# Patient Record
Sex: FEMALE | Race: WHITE | Employment: OTHER | URBAN - METROPOLITAN AREA
[De-identification: names, ages, dates, MRNs, and addresses within clinical notes are randomized per-mention and may not be internally consistent; named-entity substitution may affect disease eponyms.]

---

## 2017-01-23 RX ORDER — OLMESARTAN MEDOXOMIL 20 MG/1
TABLET, FILM COATED ORAL
Qty: 90 TAB | Refills: 0 | Status: SHIPPED | OUTPATIENT
Start: 2017-01-23 | End: 2017-05-03 | Stop reason: SDUPTHER

## 2017-03-13 ENCOUNTER — OFFICE VISIT (OUTPATIENT)
Dept: INTERNAL MEDICINE CLINIC | Age: 72
End: 2017-03-13

## 2017-03-13 VITALS
RESPIRATION RATE: 16 BRPM | HEART RATE: 98 BPM | DIASTOLIC BLOOD PRESSURE: 69 MMHG | WEIGHT: 183 LBS | SYSTOLIC BLOOD PRESSURE: 123 MMHG | BODY MASS INDEX: 30.49 KG/M2 | TEMPERATURE: 98.4 F | HEIGHT: 65 IN | OXYGEN SATURATION: 98 %

## 2017-03-13 DIAGNOSIS — L30.9 DERMATITIS: Primary | ICD-10-CM

## 2017-03-13 RX ORDER — BETAMETHASONE DIPROPIONATE 0.5 MG/G
CREAM TOPICAL 2 TIMES DAILY
Qty: 30 G | Refills: 0 | Status: SHIPPED | OUTPATIENT
Start: 2017-03-13 | End: 2017-03-23 | Stop reason: SDUPTHER

## 2017-03-13 RX ORDER — ALPRAZOLAM 0.25 MG/1
TABLET ORAL
Qty: 30 TAB | Refills: 1 | OUTPATIENT
Start: 2017-03-13 | End: 2017-06-13 | Stop reason: SDUPTHER

## 2017-03-13 NOTE — MR AVS SNAPSHOT
Visit Information Date & Time Provider Department Dept. Phone Encounter #  
 3/13/2017 10:40 AM Cortez Marquez NP [facility] [address] [id number] Upcoming Health Maintenance Date Due DTaP/Tdap/Td series (1 - Tdap) 7/22/1965 Pneumococcal 65+ Low/Medium Risk (1 of 2 - PCV13) 7/22/2009 INFLUENZA AGE 9 TO ADULT 8/1/2016 MEDICARE YEARLY EXAM 12/7/2017 GLAUCOMA SCREENING Q2Y 7/28/2018 BREAST CANCER SCRN MAMMOGRAM 8/29/2018 COLONOSCOPY 11/15/2021 Allergies as of 3/13/2017  Review Complete On: 3/13/2017 By: Cortez Marquez NP Severity Noted Reaction Type Reactions Ciprofloxacin  02/25/2016    Swelling Other Medication  02/25/2016    Swelling Steroid shots Pcn [Penicillins]  01/25/2013    Rash Current Immunizations  Never Reviewed Name Date Influenza Vaccine 10/1/2015, 10/27/2014 Zoster Vaccine, Live 1/1/2013 Not reviewed this visit You Were Diagnosed With   
  
 Codes Comments Dermatitis    -  Primary ICD-10-CM: L30.9 ICD-9-CM: 692.9 Vitals BP Pulse Temp Resp Height(growth percentile) Weight(growth percentile) 123/69 (BP 1 Location: Left arm, BP Patient Position: Sitting) 98 98.4 °F (36.9 °C) (Oral) 16 5' 5\" (1.651 m) 183 lb (83 kg) SpO2 BMI OB Status Smoking Status 98% 30.45 kg/m2 Postmenopausal Never Smoker BMI and BSA Data Body Mass Index Body Surface Area  
 30.45 kg/m 2 1.95 m 2 Preferred Pharmacy Pharmacy Name Phone [facility] 1 [address] AT [address] [facility] [phone] Your Updated Medication List  
  
   
This list is accurate as of: 3/13/17 11:40 AM.  Always use your most recent med list.  
  
  
  
  
 ALPRAZolam 0.25 mg tablet Commonly known as:  XANAX  
TAKE 1 TABLET BY MOUTH EACH EVENING  
  
 aspirin 81 mg tablet Take 81 mg by mouth daily. atorvastatin 10 mg tablet Commonly known as:  LIPITOR  
TAKE 1 TABLET BY MOUTH EVERY DAY  
  
 augmented betamethasone dipropionate 0.05 % topical cream  
Commonly known as:  Geanie Frame Apply  to affected area two (2) times a day. BENICAR 20 mg tablet Generic drug:  olmesartan TAKE 1 TABLET BY MOUTH EVERY DAY  
  
 BIOTIN PO Take 1,000 mcg by mouth daily. CALCIUM 600 WITH VITAMIN D3 600 mg(1,500mg) -400 unit Cap Generic drug:  Calcium-Cholecalciferol (D3) Take  by mouth. ERYPED 200 200 mg/5 mL suspension Generic drug:  erythromycin FISH OIL 1,000 mg Cap Generic drug:  omega-3 fatty acids-vitamin e Take 1 Cap by mouth daily. magnesium 250 mg Tab Take 1 Tab by mouth daily. multivitamin tablet Commonly known as:  ONE A DAY Take 1 Tab by mouth daily. NexIUM 40 mg capsule Generic drug:  esomeprazole Take 40 mg by mouth daily. ondansetron 4 mg disintegrating tablet Commonly known as:  ZOFRAN ODT Take 1 Tab by mouth every eight (8) hours as needed for Nausea. PROMISEB Crea Generic drug:  Emollient Combination No.43  
by Apply Externally route. valACYclovir 500 mg tablet Commonly known as:  VALTREX Take  by mouth two (2) times a day. VITAMIN D3 1,000 unit tablet Generic drug:  cholecalciferol Take  by mouth daily. Prescriptions Sent to Pharmacy Refills  
 augmented betamethasone dipropionate (DIPROLENE-AF) 0.05 % topical cream 0 Sig: Apply  to affected area two (2) times a day. Class: Normal  
 Pharmacy: [facility] [address] [place] [address] [id number] AT [facility] of [address] ([facility] Ph #: [phone] Route: Topical  
  
Patient Instructions Dermatitis: Care Instructions Your Care Instructions Dermatitis is the general name used for any rash or inflammation of the skin. Different kinds of dermatitis cause different kinds of rashes. Common causes of a rash include new medicines, plants (such as poison oak or poison ivy), heat, and stress. Certain illnesses can also cause a rash. An allergic reaction to something that touches your skin, such as latex, nickel, or poison ivy, is called contact dermatitis. Contact dermatitis may also be caused by something that irritates the skin, such as bleach, a chemical, or soap. These types of rashes cannot be spread from person to person. How long your rash will last depends on what caused it. Rashes may last a few days or months. Follow-up care is a key part of your treatment and safety. Be sure to make and go to all appointments, and call your doctor if you are having problems. It's also a good idea to know your test results and keep a list of the medicines you take. How can you care for yourself at home? · Do not scratch the rash. Cut your nails short, and file them smooth. Or wear gloves if this helps keep you from scratching. · Wash the area with water only. Pat dry. · Put cold, wet cloths on the rash to reduce itching. · Keep cool, and stay out of the sun. · Leave the rash open to the air as much as possible. · If the rash itches, use hydrocortisone cream. Follow the directions on the label. Calamine lotion may help for plant rashes. · Take an over-the-counter antihistamine, such as diphenhydramine (Benadryl) or loratadine (Claritin), to help calm the itching. Read and follow all instructions on the label. · If your doctor prescribed a cream, use it as directed. If your doctor prescribed medicine, take it exactly as directed. When should you call for help? Call your doctor now or seek immediate medical care if: 
· You have symptoms of infection, such as: 
¨ Increased pain, swelling, warmth, or redness. ¨ Red streaks leading from the area. ¨ Pus draining from the area. ¨ A fever. · You have joint pain along with the rash. Watch closely for changes in your health, and be sure to contact your doctor if: 
· Your rash is changing or getting worse. · You are not getting better as expected. Where can you learn more? Go to http://eric-layla.info/. Enter [phone] in the search box to learn more about \"Dermatitis: Care Instructions. \" Current as of: May 10, 2016 Content Version: 11.1 © 1705-6559 [employer]. Care instructions adapted under license by [employer]mension (which disclaims liability or warranty for this information). If you have questions about a medical condition or this instruction, always ask your healthcare professional. [address] any warranty or liability for your use of this information. Introducing hospitals & HEALTH SERVICES! Dear Regulo العراقي: Thank you for requesting a CytoLogic account. Our records indicate that you already have an active CytoLogic account. You can access your account anytime at [email or web address] Did you know that you can access your hospital and ER discharge instructions at any time in CytoLogic? You can also review all of your test results from your hospital stay or ER visit. Additional Information If you have questions, please visit the Frequently Asked Questions section of the CytoLogic website at [email or web address] Remember, CytoLogic is NOT to be used for urgent needs. For medical emergencies, dial 911. Now available from your iPhone and Android! Please provide this summary of care documentation to your next provider. Your primary care clinician is listed as Tari Li. If you have any questions after today's visit, please call [phone].

## 2017-03-13 NOTE — PROGRESS NOTES
HISTORY OF PRESENT ILLNESS  Cherelle Celis is a 66 y.o. female. HPI  Presents with complaints of tender rash to right groin area for the past 2-3 weeks; she felt like symptoms began after she was seen in urology office for bladder biopsy. Area was initially slightly itchy and she went to see dermatology who prescribed Nystatin with Triamcinolone cream which she used for 4-5 days but had to stop due to increased burning in area. Feels like symptoms have worsened and she cannot wear underwear due to constant rubbing in area of rash. Review of Systems   Constitutional: Negative for chills and fever. Respiratory: Negative for cough. Cardiovascular: Negative for chest pain. Gastrointestinal: Negative for nausea and vomiting. Skin: Positive for itching and rash. Neurological: Negative for dizziness, tingling and headaches. Physical Exam   Constitutional: She appears well-developed and well-nourished. HENT:   Head: Normocephalic and atraumatic. Cardiovascular: Normal rate and regular rhythm. Pulmonary/Chest: Effort normal and breath sounds normal.   Skin: Rash noted. Rash is papular. Erythematous papular rash in right inguinal fold; does not have fungal or shingles characteristics   Psychiatric: Her mood appears anxious. Nursing note and vitals reviewed. ASSESSMENT and PLAN  Drew Barkley was seen today for rash. Diagnoses and all orders for this visit:    Dermatitis -- does not appear to be fungal in nature; will treat with higher potency steroid cream  -     augmented betamethasone dipropionate (DIPROLENE-AF) 0.05 % topical cream; Apply  to affected area two (2) times a day.       reviewed diet, exercise and weight control  reviewed medications and side effects in detail

## 2017-03-13 NOTE — PROGRESS NOTES
Have you been to the ER or urgent care clinic since your last visit? Went to dermo. X 2 weeks   Got antifungal creme   No better     Have you been hospitalized since your last visit? No     Have you been seen or consulted any other health care provider outside of [facility] since your last visit (including pap smears, colonoscopy screening)?    No

## 2017-03-13 NOTE — PATIENT INSTRUCTIONS
Dermatitis: Care Instructions  Your Care Instructions  Dermatitis is the general name used for any rash or inflammation of the skin. Different kinds of dermatitis cause different kinds of rashes. Common causes of a rash include new medicines, plants (such as poison oak or poison ivy), heat, and stress. Certain illnesses can also cause a rash. An allergic reaction to something that touches your skin, such as latex, nickel, or poison ivy, is called contact dermatitis. Contact dermatitis may also be caused by something that irritates the skin, such as bleach, a chemical, or soap. These types of rashes cannot be spread from person to person. How long your rash will last depends on what caused it. Rashes may last a few days or months. Follow-up care is a key part of your treatment and safety. Be sure to make and go to all appointments, and call your doctor if you are having problems. It's also a good idea to know your test results and keep a list of the medicines you take. How can you care for yourself at home? · Do not scratch the rash. Cut your nails short, and file them smooth. Or wear gloves if this helps keep you from scratching. · Wash the area with water only. Pat dry. · Put cold, wet cloths on the rash to reduce itching. · Keep cool, and stay out of the sun. · Leave the rash open to the air as much as possible. · If the rash itches, use hydrocortisone cream. Follow the directions on the label. Calamine lotion may help for plant rashes. · Take an over-the-counter antihistamine, such as diphenhydramine (Benadryl) or loratadine (Claritin), to help calm the itching. Read and follow all instructions on the label. · If your doctor prescribed a cream, use it as directed. If your doctor prescribed medicine, take it exactly as directed. When should you call for help?   Call your doctor now or seek immediate medical care if:  · You have symptoms of infection, such as:  ¨ Increased pain, swelling, warmth, or redness. ¨ Red streaks leading from the area. ¨ Pus draining from the area. ¨ A fever. · You have joint pain along with the rash. Watch closely for changes in your health, and be sure to contact your doctor if:  · Your rash is changing or getting worse. · You are not getting better as expected. Where can you learn more? Go to http://eric-layla.info/. Enter ([phone] in the search box to learn more about \"Dermatitis: Care Instructions. \"  Current as of: May 10, 2016  Content Version: 11.1  © 0442-6775 [employer]. Care instructions adapted under license by COLOURlovers (which disclaims liability or warranty for this information). If you have questions about a medical condition or this instruction, always ask your healthcare professional. [address] any warranty or liability for your use of this information.

## 2017-03-23 RX ORDER — BETAMETHASONE DIPROPIONATE 0.5 MG/G
CREAM TOPICAL 2 TIMES DAILY
Qty: 30 G | Refills: 0 | Status: SHIPPED | OUTPATIENT
Start: 2017-03-23 | End: 2017-11-08

## 2017-04-04 ENCOUNTER — OFFICE VISIT (OUTPATIENT)
Dept: INTERNAL MEDICINE CLINIC | Age: 72
End: 2017-04-04

## 2017-04-04 VITALS
HEART RATE: 89 BPM | HEIGHT: 65 IN | RESPIRATION RATE: 18 BRPM | WEIGHT: 186 LBS | DIASTOLIC BLOOD PRESSURE: 60 MMHG | BODY MASS INDEX: 30.99 KG/M2 | SYSTOLIC BLOOD PRESSURE: 111 MMHG | TEMPERATURE: 97.7 F | OXYGEN SATURATION: 98 %

## 2017-04-04 DIAGNOSIS — L23.9 ALLERGIC DERMATITIS: Primary | ICD-10-CM

## 2017-04-04 DIAGNOSIS — B37.89 CANDIDIASIS OF BREAST: ICD-10-CM

## 2017-04-04 RX ORDER — KETOCONAZOLE 20 MG/G
CREAM TOPICAL 2 TIMES DAILY
Qty: 30 G | Refills: 0 | Status: SHIPPED | OUTPATIENT
Start: 2017-04-04 | End: 2018-08-10 | Stop reason: ALTCHOICE

## 2017-04-04 NOTE — MR AVS SNAPSHOT
Visit Information Date & Time Provider Department Dept. Phone Encounter #  
 4/4/2017  8:40 AM Agnes Owens NP [facility] [address] [id number] Upcoming Health Maintenance Date Due DTaP/Tdap/Td series (1 - Tdap) 7/22/1965 Pneumococcal 65+ Low/Medium Risk (1 of 2 - PCV13) 7/22/2009 INFLUENZA AGE 9 TO ADULT 8/1/2016 MEDICARE YEARLY EXAM 12/7/2017 GLAUCOMA SCREENING Q2Y 7/28/2018 BREAST CANCER SCRN MAMMOGRAM 8/29/2018 COLONOSCOPY 11/15/2021 Allergies as of 4/4/2017  Review Complete On: 4/4/2017 By: Agnes Owens NP Severity Noted Reaction Type Reactions Ciprofloxacin  02/25/2016    Swelling Other Medication  02/25/2016    Swelling Steroid shots Pcn [Penicillins]  01/25/2013    Rash Current Immunizations  Never Reviewed Name Date Influenza Vaccine 10/1/2015, 10/27/2014 Zoster Vaccine, Live 1/1/2013 Not reviewed this visit You Were Diagnosed With   
  
 Codes Comments Allergic dermatitis    -  Primary ICD-10-CM: L23.9 ICD-9-CM: 692.9 Candidiasis of breast     ICD-10-CM: B37.89 ICD-9-CM: 112.89 Vitals BP Pulse Temp Resp Height(growth percentile) Weight(growth percentile) 111/60 (BP 1 Location: Left arm, BP Patient Position: Sitting) 89 97.7 °F (36.5 °C) (Oral) 18 5' 5\" (1.651 m) 186 lb (84.4 kg) SpO2 BMI OB Status Smoking Status 98% 30.95 kg/m2 Postmenopausal Never Smoker Vitals History BMI and BSA Data Body Mass Index Body Surface Area 30.95 kg/m 2 1.97 m 2 Preferred Pharmacy Pharmacy Name Phone [facility]ORE 1 [address]  [facility] [phone] Your Updated Medication List  
  
   
This list is accurate as of: 4/4/17  9:29 AM.  Always use your most recent med list.  
  
  
  
  
 ALPRAZolam 0.25 mg tablet Commonly known as:  Karon Delarosa TAKE 1 TABLET BY MOUTH EVERY EVENING  
  
 aspirin 81 mg tablet Take 81 mg by mouth daily. atorvastatin 10 mg tablet Commonly known as:  LIPITOR  
TAKE 1 TABLET BY MOUTH EVERY DAY  
  
 augmented betamethasone dipropionate 0.05 % topical cream  
Commonly known as:  Lucas Blend Apply  to affected area two (2) times a day. BENICAR 20 mg tablet Generic drug:  olmesartan TAKE 1 TABLET BY MOUTH EVERY DAY  
  
 BIOTIN PO Take 1,000 mcg by mouth daily. CALCIUM 600 WITH VITAMIN D3 600 mg(1,500mg) -400 unit Cap Generic drug:  Calcium-Cholecalciferol (D3) Take  by mouth. ERYPED 200 200 mg/5 mL suspension Generic drug:  erythromycin FISH OIL 1,000 mg Cap Generic drug:  omega-3 fatty acids-vitamin e Take 1 Cap by mouth daily. ketoconazole 2 % topical cream  
Commonly known as:  NIZORAL Apply  to affected area two (2) times a day. magnesium 250 mg Tab Take 1 Tab by mouth daily. multivitamin tablet Commonly known as:  ONE A DAY Take 1 Tab by mouth daily. NexIUM 40 mg capsule Generic drug:  esomeprazole Take 40 mg by mouth daily. ondansetron 4 mg disintegrating tablet Commonly known as:  ZOFRAN ODT Take 1 Tab by mouth every eight (8) hours as needed for Nausea. PROMISEB Crea Generic drug:  Emollient Combination No.43  
by Apply Externally route. valACYclovir 500 mg tablet Commonly known as:  VALTREX Take  by mouth two (2) times a day. VITAMIN D3 1,000 unit tablet Generic drug:  cholecalciferol Take  by mouth daily. Prescriptions Sent to Pharmacy Refills  
 ketoconazole (NIZORAL) 2 % topical cream 0 Sig: Apply  to affected area two (2) times a day. Class: Normal  
 Pharmacy: [facility] [address] [place] [address] [id number] AT [facility] of [address] ([facility] Ph #: [phone] Route: Topical  
  
Patient Instructions Candidiasis: Care Instructions Your Care Instructions Candidiasis (say \"kah-zzc-WL-uh-india\") is a yeast infection. Yeast normally lives in your body. But it can cause problems if your body's defenses don't work as they should. Some medicines can increase your chance of getting a yeast infection. These include antibiotics, steroids, and cancer drugs. And some diseases like AIDS and diabetes can make you more likely to get yeast infections. There are different types of yeast infections. Katherine Sink is a yeast infection in the mouth. It usually occurs in people with weak immune systems. It causes white patches inside the mouth and throat. Yeast infections of the skin usually occur in skin folds where the skin stays moist. They cause red, oozing patches on your skin. Babies can get these infections under the diaper. People who often wear gloves can get them on their hands. Many women get vaginal yeast infections. They are most common when women take antibiotics. These infections can cause the vagina to itch and burn. They also cause white discharge that looks like cottage cheese. In rare cases, yeast infects the blood. This can cause serious disease. This kind of infection is treated with medicine given through a needle into a vein (IV). After you start treatment, a yeast infection usually goes away quickly. But if your immune system is weak, the infection may come back. Tell your doctor if you get yeast infections often. Follow-up care is a key part of your treatment and safety. Be sure to make and go to all appointments, and call your doctor if you are having problems. It's also a good idea to know your test results and keep a list of the medicines you take. How can you care for yourself at home? · Take your medicines exactly as prescribed. Call your doctor if you think you are having a problem with your medicine. · Use antibiotics only as directed by your doctor. · Eat yogurt with live cultures. It has bacteria called lactobacillus.  It may help prevent some types of yeast infections. · Keep your skin clean and dry. Put powder on moist places. · If you are using a cream or suppository to treat a vaginal yeast infection, don't use condoms or a diaphragm. Use a different type of birth control. · Eat a healthy diet and get regular exercise. This will help keep your immune system strong. When should you call for help? Call your doctor now or seek immediate medical care if: 
· You have a fever. · You are pregnant and have signs of a vaginal or urinary tract infection such as: ¨ Severe itching in your vagina. ¨ Pain during sex or when you urinate. ¨ Unusual discharge from your vagina. ¨ A frequent urge to urinate. ¨ Urine that is cloudy or smells bad. Watch closely for changes in your health, and be sure to contact your doctor if: 
· You do not get better as expected. Where can you learn more? Go to http://eric-layla.info/. Enter W525 in the search box to learn more about \"Candidiasis: Care Instructions. \" Current as of: October 13, 2016 Content Version: 11.2 © 6169-6346 [employer]BaoTech. Care instructions adapted under license by [employer] Fortress (which disclaims liability or warranty for this information). If you have questions about a medical condition or this instruction, always ask your healthcare professional. [place] [address] any warranty or liability for your use of this information. Dermatitis: Care Instructions Your Care Instructions Dermatitis is the general name used for any rash or inflammation of the skin. Different kinds of dermatitis cause different kinds of rashes. Common causes of a rash include new medicines, plants (such as poison oak or poison ivy), heat, and stress. Certain illnesses can also cause a rash. An allergic reaction to something that touches your skin, such as latex, nickel, or poison ivy, is called contact dermatitis.  Contact dermatitis may also be caused by something that irritates the skin, such as bleach, a chemical, or soap. These types of rashes cannot be spread from person to person. How long your rash will last depends on what caused it. Rashes may last a few days or months. Follow-up care is a key part of your treatment and safety. Be sure to make and go to all appointments, and call your doctor if you are having problems. It's also a good idea to know your test results and keep a list of the medicines you take. How can you care for yourself at home? · Do not scratch the rash. Cut your nails short, and file them smooth. Or wear gloves if this helps keep you from scratching. · Wash the area with water only. Pat dry. · Put cold, wet cloths on the rash to reduce itching. · Keep cool, and stay out of the sun. · Leave the rash open to the air as much as possible. · If the rash itches, use hydrocortisone cream. Follow the directions on the label. Calamine lotion may help for plant rashes. · Take an over-the-counter antihistamine, such as diphenhydramine (Benadryl) or loratadine (Claritin), to help calm the itching. Read and follow all instructions on the label. · If your doctor prescribed a cream, use it as directed. If your doctor prescribed medicine, take it exactly as directed. When should you call for help? Call your doctor now or seek immediate medical care if: 
· You have symptoms of infection, such as: 
¨ Increased pain, swelling, warmth, or redness. ¨ Red streaks leading from the area. ¨ Pus draining from the area. ¨ A fever. · You have joint pain along with the rash. Watch closely for changes in your health, and be sure to contact your doctor if: 
· Your rash is changing or getting worse. · You are not getting better as expected. Where can you learn more? Go to http://eric-layla.info/. Enter ([phone] [phone] in the search box to learn more about \"Dermatitis: Care Instructions. \" 
 Current as of: October 13, 2016 Content Version: 11.2 © 2594-0454 [facility], [employer] Care instructions adapted under license by Booxmedia (which disclaims liability or warranty for this information). If you have questions about a medical condition or this instruction, always ask your healthcare professional. [address] any warranty or liability for your use of this information. Introducing [facility] & HEALTH SERVICES! Dear Greyson Counts: Thank you for requesting a KidAdmit account. Our records indicate that you already have an active KidAdmit account. You can access your account anytime at [email or web address], [email or web address]." Did you know that you can access your hospital and ER discharge instructions at any time in KidAdmit? You can also review all of your test results from your hospital stay or ER visit. Additional Information If you have questions, please visit the Frequently Asked Questions section of the KidAdmit website at [email or web address] Remember, KidAdmit is NOT to be used for urgent needs. For medical emergencies, dial 911. Now available from your iPhone and Android! Please provide this summary of care documentation to your next provider. Your primary care clinician is listed as Era Sender. If you have any questions after today's visit, please call [phone].

## 2017-04-04 NOTE — PATIENT INSTRUCTIONS
Candidiasis: Care Instructions  Your Care Instructions  Candidiasis (say \"pvj-jso-XS-uh-india\") is a yeast infection. Yeast normally lives in your body. But it can cause problems if your body's defenses don't work as they should. Some medicines can increase your chance of getting a yeast infection. These include antibiotics, steroids, and cancer drugs. And some diseases like AIDS and diabetes can make you more likely to get yeast infections. There are different types of yeast infections. Stacy Fiddler is a yeast infection in the mouth. It usually occurs in people with weak immune systems. It causes white patches inside the mouth and throat. Yeast infections of the skin usually occur in skin folds where the skin stays moist. They cause red, oozing patches on your skin. Babies can get these infections under the diaper. People who often wear gloves can get them on their hands. Many women get vaginal yeast infections. They are most common when women take antibiotics. These infections can cause the vagina to itch and burn. They also cause white discharge that looks like cottage cheese. In rare cases, yeast infects the blood. This can cause serious disease. This kind of infection is treated with medicine given through a needle into a vein (IV). After you start treatment, a yeast infection usually goes away quickly. But if your immune system is weak, the infection may come back. Tell your doctor if you get yeast infections often. Follow-up care is a key part of your treatment and safety. Be sure to make and go to all appointments, and call your doctor if you are having problems. It's also a good idea to know your test results and keep a list of the medicines you take. How can you care for yourself at home? · Take your medicines exactly as prescribed. Call your doctor if you think you are having a problem with your medicine. · Use antibiotics only as directed by your doctor. · Eat yogurt with live cultures.  It has bacteria called lactobacillus. It may help prevent some types of yeast infections. · Keep your skin clean and dry. Put powder on moist places. · If you are using a cream or suppository to treat a vaginal yeast infection, don't use condoms or a diaphragm. Use a different type of birth control. · Eat a healthy diet and get regular exercise. This will help keep your immune system strong. When should you call for help? Call your doctor now or seek immediate medical care if:  · You have a fever. · You are pregnant and have signs of a vaginal or urinary tract infection such as:  ¨ Severe itching in your vagina. ¨ Pain during sex or when you urinate. ¨ Unusual discharge from your vagina. ¨ A frequent urge to urinate. ¨ Urine that is cloudy or smells bad. Watch closely for changes in your health, and be sure to contact your doctor if:  · You do not get better as expected. Where can you learn more? Go to http://eric-layla.info/. Enter B049 in the search box to learn more about \"Candidiasis: Care Instructions. \"  Current as of: October 13, 2016  Content Version: 11.2  © 5164-3702 [employer]. Care instructions adapted under license by [employer] (which disclaims liability or warranty for this information). If you have questions about a medical condition or this instruction, always ask your healthcare professional. [place] [address] any warranty or liability for your use of this information. Dermatitis: Care Instructions  Your Care Instructions  Dermatitis is the general name used for any rash or inflammation of the skin. Different kinds of dermatitis cause different kinds of rashes. Common causes of a rash include new medicines, plants (such as poison oak or poison ivy), heat, and stress. Certain illnesses can also cause a rash.   An allergic reaction to something that touches your skin, such as latex, nickel, or poison ivy, is called contact dermatitis. Contact dermatitis may also be caused by something that irritates the skin, such as bleach, a chemical, or soap. These types of rashes cannot be spread from person to person. How long your rash will last depends on what caused it. Rashes may last a few days or months. Follow-up care is a key part of your treatment and safety. Be sure to make and go to all appointments, and call your doctor if you are having problems. It's also a good idea to know your test results and keep a list of the medicines you take. How can you care for yourself at home? · Do not scratch the rash. Cut your nails short, and file them smooth. Or wear gloves if this helps keep you from scratching. · Wash the area with water only. Pat dry. · Put cold, wet cloths on the rash to reduce itching. · Keep cool, and stay out of the sun. · Leave the rash open to the air as much as possible. · If the rash itches, use hydrocortisone cream. Follow the directions on the label. Calamine lotion may help for plant rashes. · Take an over-the-counter antihistamine, such as diphenhydramine (Benadryl) or loratadine (Claritin), to help calm the itching. Read and follow all instructions on the label. · If your doctor prescribed a cream, use it as directed. If your doctor prescribed medicine, take it exactly as directed. When should you call for help? Call your doctor now or seek immediate medical care if:  · You have symptoms of infection, such as:  ¨ Increased pain, swelling, warmth, or redness. ¨ Red streaks leading from the area. ¨ Pus draining from the area. ¨ A fever. · You have joint pain along with the rash. Watch closely for changes in your health, and be sure to contact your doctor if:  · Your rash is changing or getting worse. · You are not getting better as expected. Where can you learn more? Go to http://eric-layla.info/.   Enter [id number] in the search box to learn more about \"Dermatitis: Care Instructions. \"  Current as of: October 13, 2016  Content Version: 11.2  © 1713-1716 [employer], [facility]. Care instructions adapted under license by [employer] (which disclaims liability or warranty for this information). If you have questions about a medical condition or this instruction, always ask your healthcare professional. [place] [address] any warranty or liability for your use of this information.

## 2017-04-04 NOTE — PROGRESS NOTES
HISTORY OF PRESENT ILLNESS  Aliza May is a 66 y.o. female. HPI  Presents with concerns about return of groin rash and persistence of rash under left breast.  Was seen initially on 3/13/17 with itchy rash to right groin area and given Rx for Betamethasone cream which she used and rash resolved completely. Had applied Vaseline to area 2 days ago and immediately developed a fine rash to area. Has been having itchy reddened rash under left breast for the past 2 weeks and used some of the steroid cream but this has only lightened rash and has not resolved. She has dermatologist that she sees as needed. Review of Systems   Constitutional: Negative for chills, fever and malaise/fatigue. HENT: Negative for congestion and sore throat. Respiratory: Negative for cough and shortness of breath. Cardiovascular: Negative for chest pain and palpitations. Gastrointestinal: Negative for nausea and vomiting. Musculoskeletal: Negative for myalgias. Skin: Positive for itching and rash. Neurological: Negative for dizziness, tingling and headaches. Physical Exam   Constitutional: She is oriented to person, place, and time. She appears well-developed and well-nourished. HENT:   Head: Normocephalic and atraumatic. Neck: Normal range of motion. Neck supple. Cardiovascular: Normal rate and regular rhythm. Pulmonary/Chest: Effort normal and breath sounds normal.   Neurological: She is alert and oriented to person, place, and time. Skin: Rash noted. Rash is papular. Scattered erythematous papules to right inguinal fold; left breast with erythematous plaque to underside of breast with scattered satellite lesions   Psychiatric: She has a normal mood and affect. Her behavior is normal.   Nursing note and vitals reviewed. ASSESSMENT and PLAN  Sneha Amador was seen today for rash.     Diagnoses and all orders for this visit:    Allergic dermatitis -- right groin rash appears allergic in nature and question whether she has had local reaction to Vaseline; advised to clean area and leave open to air as much as possible    Candidiasis of breast -- to use twice daily to rash under left breast x 2 weeks; if not resolving, will have see dermatology for further evaluation. Leave open to air as much as possible. -     ketoconazole (NIZORAL) 2 % topical cream; Apply  to affected area two (2) times a day.       lab results and schedule of future lab studies reviewed with patient  reviewed diet, exercise and weight control  reviewed medications and side effects in detail

## 2017-05-03 RX ORDER — OLMESARTAN MEDOXOMIL 20 MG/1
TABLET, FILM COATED ORAL
Qty: 90 TAB | Refills: 0 | Status: SHIPPED | OUTPATIENT
Start: 2017-05-03 | End: 2017-07-30 | Stop reason: SDUPTHER

## 2017-05-24 RX ORDER — ATORVASTATIN CALCIUM 10 MG/1
TABLET, FILM COATED ORAL
Qty: 90 TAB | Refills: 0 | Status: SHIPPED | OUTPATIENT
Start: 2017-05-24 | End: 2017-09-25 | Stop reason: SDUPTHER

## 2017-06-13 ENCOUNTER — OFFICE VISIT (OUTPATIENT)
Dept: INTERNAL MEDICINE CLINIC | Age: 72
End: 2017-06-13

## 2017-06-13 ENCOUNTER — HOSPITAL ENCOUNTER (OUTPATIENT)
Dept: LAB | Age: 72
Discharge: HOME | End: 2017-06-13
Payer: MEDICARE

## 2017-06-13 VITALS
DIASTOLIC BLOOD PRESSURE: 62 MMHG | RESPIRATION RATE: 14 BRPM | BODY MASS INDEX: 30.96 KG/M2 | TEMPERATURE: 97.7 F | HEIGHT: 65 IN | HEART RATE: 76 BPM | WEIGHT: 185.8 LBS | SYSTOLIC BLOOD PRESSURE: 123 MMHG

## 2017-06-13 DIAGNOSIS — B37.2 YEAST DERMATITIS: Primary | ICD-10-CM

## 2017-06-13 DIAGNOSIS — R73.01 IMPAIRED FASTING GLUCOSE: ICD-10-CM

## 2017-06-13 DIAGNOSIS — F41.9 ANXIETY: ICD-10-CM

## 2017-06-13 DIAGNOSIS — E78.5 HYPERLIPIDEMIA LDL GOAL <130: Chronic | ICD-10-CM

## 2017-06-13 DIAGNOSIS — I10 ESSENTIAL HYPERTENSION, BENIGN: Chronic | ICD-10-CM

## 2017-06-13 PROCEDURE — 83036 HEMOGLOBIN GLYCOSYLATED A1C: CPT

## 2017-06-13 PROCEDURE — 36415 COLL VENOUS BLD VENIPUNCTURE: CPT

## 2017-06-13 PROCEDURE — 80061 LIPID PANEL: CPT

## 2017-06-13 PROCEDURE — 80053 COMPREHEN METABOLIC PANEL: CPT

## 2017-06-13 RX ORDER — ALPRAZOLAM 0.25 MG/1
TABLET ORAL
Qty: 60 TAB | Refills: 1 | Status: SHIPPED | OUTPATIENT
Start: 2017-06-13 | End: 2017-10-23 | Stop reason: SDUPTHER

## 2017-06-13 RX ORDER — KETOCONAZOLE 20 MG/G
CREAM TOPICAL DAILY
Qty: 30 G | Refills: 0 | Status: SHIPPED | OUTPATIENT
Start: 2017-06-13 | End: 2017-11-08 | Stop reason: SDUPTHER

## 2017-06-13 RX ORDER — FLUCONAZOLE 150 MG/1
150 TABLET ORAL DAILY
Qty: 2 TAB | Refills: 0 | Status: SHIPPED | OUTPATIENT
Start: 2017-06-13 | End: 2017-06-14

## 2017-06-13 NOTE — MR AVS SNAPSHOT
Visit Information Date & Time Provider Department Dept. Phone Encounter #  
 6/13/2017 10:40 AM Ada Acuña NP [facility] [address] [id number] Upcoming Health Maintenance Date Due DTaP/Tdap/Td series (1 - Tdap) 7/22/1966 Pneumococcal 65+ Low/Medium Risk (1 of 2 - PCV13) 7/22/2009 INFLUENZA AGE 9 TO ADULT 8/1/2017 MEDICARE YEARLY EXAM 12/7/2017 GLAUCOMA SCREENING Q2Y 7/28/2018 BREAST CANCER SCRN MAMMOGRAM 8/29/2018 COLONOSCOPY 11/15/2021 Allergies as of 6/13/2017  Review Complete On: 6/13/2017 By: Ada Acuña NP Severity Noted Reaction Type Reactions Ciprofloxacin  02/25/2016    Swelling Other Medication  02/25/2016    Swelling Steroid shots Pcn [Penicillins]  01/25/2013    Rash Current Immunizations  Never Reviewed Name Date Influenza Vaccine 10/1/2015, 10/27/2014 Zoster Vaccine, Live 1/1/2013 Not reviewed this visit You Were Diagnosed With   
  
 Codes Comments Yeast dermatitis    -  Primary ICD-10-CM: B37.2 ICD-9-CM: 112.3 Essential hypertension, benign     ICD-10-CM: I10 
ICD-9-CM: 401.1 Hyperlipidemia LDL goal <130     ICD-10-CM: E78.5 ICD-9-CM: 272.4 Anxiety     ICD-10-CM: F41.9 ICD-9-CM: 300.00 Impaired fasting glucose     ICD-10-CM: R73.01 
ICD-9-CM: 790.21 Vitals BP Pulse Temp Resp Height(growth percentile) Weight(growth percentile) 123/62 (BP 1 Location: Left arm, BP Patient Position: Sitting) 76 97.7 °F (36.5 °C) (Oral) 14 5' 5\" (1.651 m) 185 lb 12.8 oz (84.3 kg) BMI OB Status Smoking Status 30.92 kg/m2 Postmenopausal Never Smoker BMI and BSA Data Body Mass Index Body Surface Area 30.92 kg/m 2 1.97 m 2 Preferred Pharmacy Pharmacy Name Phone [facility] [address]  [facility] [phone] Your Updated Medication List  
 This list is accurate as of: 6/13/17 11:46 AM.  Always use your most recent med list.  
  
  
  
  
 ALPRAZolam 0.25 mg tablet Commonly known as:  XANAX  
TAKE 1 TABLET BY MOUTH twice daily as needed  
  
 aspirin 81 mg tablet Take 81 mg by mouth daily. atorvastatin 10 mg tablet Commonly known as:  LIPITOR  
TAKE 1 TABLET BY MOUTH EVERY DAY  
  
 augmented betamethasone dipropionate 0.05 % topical cream  
Commonly known as:  Darcella Pancoast Apply  to affected area two (2) times a day. BENICAR 20 mg tablet Generic drug:  olmesartan TAKE 1 TABLET BY MOUTH EVERY DAY  
  
 BIOTIN PO Take 1,000 mcg by mouth daily. CALCIUM 600 WITH VITAMIN D3 600 mg(1,500mg) -400 unit Cap Generic drug:  Calcium-Cholecalciferol (D3) Take  by mouth. ERYPED 200 200 mg/5 mL suspension Generic drug:  erythromycin FISH OIL 1,000 mg Cap Generic drug:  omega-3 fatty acids-vitamin e Take 1 Cap by mouth daily. fluconazole 150 mg tablet Commonly known as:  DIFLUCAN Take 1 Tab by mouth daily for 1 day. Repeat in 1 week * ketoconazole 2 % topical cream  
Commonly known as:  NIZORAL Apply  to affected area two (2) times a day. * ketoconazole 2 % topical cream  
Commonly known as:  NIZORAL Apply  to affected area daily. magnesium 250 mg Tab Take 1 Tab by mouth daily. multivitamin tablet Commonly known as:  ONE A DAY Take 1 Tab by mouth daily. NexIUM 40 mg capsule Generic drug:  esomeprazole Take 40 mg by mouth daily. ondansetron 4 mg disintegrating tablet Commonly known as:  ZOFRAN ODT Take 1 Tab by mouth every eight (8) hours as needed for Nausea. PROMISEB Crea Generic drug:  Emollient Combination No.43  
by Apply Externally route. valACYclovir 500 mg tablet Commonly known as:  VALTREX Take  by mouth two (2) times a day. VITAMIN D3 1,000 unit tablet Generic drug:  cholecalciferol Take  by mouth daily. * Notice: This list has 2 medication(s) that are the same as other medications prescribed for you. Read the directions carefully, and ask your doctor or other care provider to review them with you. Prescriptions Printed Refills ALPRAZolam (XANAX) 0.25 mg tablet 1 Sig: TAKE 1 TABLET BY MOUTH twice daily as needed Class: Print Prescriptions Sent to Pharmacy Refills  
 ketoconazole (NIZORAL) 2 % topical cream 0 Sig: Apply  to affected area daily. Class: Normal  
 Pharmacy: [facility]  [address] [place] - [address] AT [facility] of [address] ([facility] Ph #: [phone] Route: Topical  
 fluconazole (DIFLUCAN) 150 mg tablet 0 Sig: Take 1 Tab by mouth daily for 1 day. Repeat in 1 week Class: Normal  
 Pharmacy: [facility] [address], [place] - [address] AT [facility] of [address] ([facility] Ph #: [phone] Route: Oral  
  
We Performed the Following HEMOGLOBIN A1C WITH EAG [03688 CPT(R)] LIPID PANEL [89035 CPT(R)] METABOLIC PANEL, COMPREHENSIVE [56276 CPT(R)] Patient Instructions Yeast Skin Infection: Care Instructions Your Care Instructions Yeast normally lives on your skin. Sometimes too much yeast can overgrow in certain areas of the skin and cause an infection. The infection causes red, scaly, moist patches on your skin that may itch. Common areas for skin yeast infections are skin folds under the breasts or belly area. The warm and moist areas in the skin folds can make it easier for yeast to overgrow. Yeast infections also can be found on other parts of the body such as the groin or armpits. You will probably get a cream or ointment that contains an antifungal medicine. Examples of these medicines are miconazole and clotrimazole. You put it on your skin to treat the infection. Your doctor may give you a prescription for the cream or ointment.  Or you may be able to buy it without a prescription at most drugstores. If the infection is severe, the doctor will prescribe antifungal pills. A yeast infection usually goes away after about a week of treatment. But it's important to use the medicine for as long as your doctor tells you to. Follow-up care is a key part of your treatment and safety. Be sure to make and go to all appointments, and call your doctor if you are having problems. It's also a good idea to know your test results and keep a list of the medicines you take. How can you care for yourself at home? · Be safe with medicines. Take your medicines exactly as prescribed. Call your doctor if you think you are having a problem with your medicine. · Keep your skin clean and dry. Your doctor may suggest using powder that contains an antifungal medicine in the skin folds. · Wear loose clothing. When should you call for help? Call your doctor now or seek immediate medical care if: 
· You have symptoms of infection, such as: 
¨ Increased pain, swelling, warmth, or redness. ¨ Red streaks leading from the area. ¨ Pus draining from the area. ¨ A fever. Watch closely for changes in your health, and be sure to contact your doctor if: 
· You find a new sore. · You do not get better as expected. Where can you learn more? Go to http://eric-layla.info/. Enter S929 in the search box to learn more about \"Yeast Skin Infection: Care Instructions. \" Current as of: November 3, 2016 Content Version: 11.2 © 7331-5039 [employer]. Care instructions adapted under license by [employer] (which disclaims liability or warranty for this information). If you have questions about a medical condition or this instruction, always ask your healthcare professional. [place] [address] any warranty or liability for your use of this information. Introducing [facility]! Dear Antony Dys: Thank you for requesting a Virtual Web account. Our records indicate that you already have an active Virtual Web account. You can access your account anytime at [email or web address] Did you know that you can access your hospital and ER discharge instructions at any time in Virtual Web? You can also review all of your test results from your hospital stay or ER visit. Additional Information If you have questions, please visit the Frequently Asked Questions section of the Virtual Web website at [email or web address] Remember, Virtual Web is NOT to be used for urgent needs. For medical emergencies, dial 911. Now available from your iPhone and Android! Please provide this summary of care documentation to your next provider. Your primary care clinician is listed as Cassi Herrmann. If you have any questions after today's visit, please call [phone].

## 2017-06-13 NOTE — PROGRESS NOTES
HISTORY OF PRESENT ILLNESS  Teri Tejada is a 66 y.o. female. HPI  Presents with recurrence of rash under bilateral breasts and inguinal folds. Was seen in March and April for similar symptoms and this resolved with use of Nizoral cream.  Has changed all of her undergarments to cotton and uses gentle soap. Has been using talcum powder to skin folds and also zinc cream for possible chafing. Noted return of reddened bumps under left breast 2 weeks ago and now has spread to right breast and bilateral groin areas. She is very distressed about recurring rash and this has made her very anxious. She has not contacted her dermatologist as was advised at last visit. Very concerned about trying to prevent rash and has been using multiple OTC powders and creams over the past several months. Subjective:   Teri Tejaad is a 66 y.o. female with hyperlipidemia. Cardiovascular risk analysis - 66 y.o. female LDL goal is under 130. ROS: taking medications as instructed, no medication side effects noted, no TIA's, no chest pain on exertion, no dyspnea on exertion, no swelling of ankles. Tolerating meds, no myalgias or other side effects noted  New concerns: tolerating Atorvastatin 10 mg without issues . Subjective:   Teri Tejada is a 66 y.o. female with hypertension. Hypertension ROS: taking medications as instructed, no medication side effects noted, no TIA's, no chest pain on exertion, no dyspnea on exertion, no swelling of ankles. New concerns: has been well controlled on current dose of Benicar 20 mg daily. Reports that she has been very anxious since her  was diagnosed with recent medical condition which she declined to discuss and does not feel that she has been coping well. Feeling very nervous during the daytime and having trouble concentrating. Takes Xanax 0.25mg on a nightly basis. Denies harmful thoughts towards self or others.   Had contacted pharmacy for refill of Xanax but this has not been approved yet. Review of Systems   Constitutional: Negative for chills and fever. HENT: Negative for congestion and sore throat. Respiratory: Negative for cough, sputum production and shortness of breath. Cardiovascular: Negative for chest pain and palpitations. Genitourinary: Negative for dysuria and frequency. Musculoskeletal: Negative for myalgias. Skin: Positive for itching and rash. Neurological: Positive for dizziness. Negative for headaches. Psychiatric/Behavioral: The patient is nervous/anxious. /62 (BP 1 Location: Left arm, BP Patient Position: Sitting)  Pulse 76  Temp 97.7 °F (36.5 °C) (Oral)   Resp 14  Ht 5' 5\" (1.651 m)  Wt 185 lb 12.8 oz (84.3 kg)  BMI 30.92 kg/m2  Physical Exam   Constitutional: She is oriented to person, place, and time. She appears well-developed and well-nourished. HENT:   Head: Normocephalic and atraumatic. Neck: Normal range of motion. Neck supple. No thyromegaly present. Cardiovascular: Normal rate and regular rhythm. Pulmonary/Chest: Effort normal and breath sounds normal. She has no wheezes. Abdominal: Soft. Bowel sounds are normal.   Musculoskeletal: Normal range of motion. Neurological: She is alert and oriented to person, place, and time. Skin: Rash noted. Rash is papular. Erythematous papules and plaques to intertrigo spaces under bilateral breasts and in bilateral inguinal folds   Psychiatric: Her behavior is normal. Her mood appears anxious. Her speech is rapid and/or pressured. ASSESSMENT and PLAN  Honey Dean was seen today for rash and labs. Diagnoses and all orders for this visit:    Yeast dermatitis -- will restart use of Nizoral and treat systemically with Diflucan weekly x 2 weeks; advised her to follow up with her dermatologist if symptoms not improving or continue to recur.   Will check A1c based on recurrent candidiasis  -     ketoconazole (NIZORAL) 2 % topical cream; Apply  to affected area daily. -     fluconazole (DIFLUCAN) 150 mg tablet; Take 1 Tab by mouth daily for 1 day. Repeat in 1 week    Essential hypertension, benign --stable  -     METABOLIC PANEL, COMPREHENSIVE    Hyperlipidemia LDL goal <130 --stable  -     METABOLIC PANEL, COMPREHENSIVE  -     LIPID PANEL    Anxiety -- has been more anxious due to recent medical issues with ; refill of Xanax and advised her that she can take 1 tab twice daily if needed. -     ALPRAZolam (XANAX) 0.25 mg tablet; TAKE 1 TABLET BY MOUTH twice daily as needed     profile was accessed online for [employer] and reviewed by me during this encounter. I did not see evidence of inappropriate or suspicious controlled substance prescription activity. Impaired fasting glucose  -     HEMOGLOBIN A1C WITH EAG      lab results and schedule of future lab studies reviewed with patient  reviewed diet, exercise and weight control  reviewed medications and side effects in detail  This note will not be viewable in [email or web address].

## 2017-06-13 NOTE — PATIENT INSTRUCTIONS
Yeast Skin Infection: Care Instructions  Your Care Instructions    Yeast normally lives on your skin. Sometimes too much yeast can overgrow in certain areas of the skin and cause an infection. The infection causes red, scaly, moist patches on your skin that may itch. Common areas for skin yeast infections are skin folds under the breasts or belly area. The warm and moist areas in the skin folds can make it easier for yeast to overgrow. Yeast infections also can be found on other parts of the body such as the groin or armpits. You will probably get a cream or ointment that contains an antifungal medicine. Examples of these medicines are miconazole and clotrimazole. You put it on your skin to treat the infection. Your doctor may give you a prescription for the cream or ointment. Or you may be able to buy it without a prescription at most drugstores. If the infection is severe, the doctor will prescribe antifungal pills. A yeast infection usually goes away after about a week of treatment. But it's important to use the medicine for as long as your doctor tells you to. Follow-up care is a key part of your treatment and safety. Be sure to make and go to all appointments, and call your doctor if you are having problems. It's also a good idea to know your test results and keep a list of the medicines you take. How can you care for yourself at home? · Be safe with medicines. Take your medicines exactly as prescribed. Call your doctor if you think you are having a problem with your medicine. · Keep your skin clean and dry. Your doctor may suggest using powder that contains an antifungal medicine in the skin folds. · Wear loose clothing. When should you call for help? Call your doctor now or seek immediate medical care if:  · You have symptoms of infection, such as:  ¨ Increased pain, swelling, warmth, or redness. ¨ Red streaks leading from the area. ¨ Pus draining from the area. ¨ A fever.   Watch closely for changes in your health, and be sure to contact your doctor if:  · You find a new sore. · You do not get better as expected. Where can you learn more? Go to http://eric-layla.info/. Enter D033 in the search box to learn more about \"Yeast Skin Infection: Care Instructions. \"  Current as of: November 3, 2016  Content Version: 11.2  © 9660-4443 [employer]. Care instructions adapted under license by [employer] (which disclaims liability or warranty for this information). If you have questions about a medical condition or this instruction, always ask your healthcare professional. [address] any warranty or liability for your use of this information.

## 2017-06-14 LAB
ALBUMIN SERPL-MCNC: 4.8 G/DL (ref 3.5–4.8)
ALBUMIN/GLOB SERPL: 1.6 {RATIO} (ref 1.2–2.2)
ALP SERPL-CCNC: 104 IU/L (ref 39–117)
ALT SERPL-CCNC: 20 IU/L (ref 0–32)
AST SERPL-CCNC: 24 IU/L (ref 0–40)
BILIRUB SERPL-MCNC: 0.6 MG/DL (ref 0–1.2)
BUN SERPL-MCNC: 12 MG/DL (ref 8–27)
BUN/CREAT SERPL: 13 (ref 12–28)
CALCIUM SERPL-MCNC: 10.2 MG/DL (ref 8.7–10.3)
CHLORIDE SERPL-SCNC: 100 MMOL/L (ref 96–106)
CHOLEST SERPL-MCNC: 187 MG/DL (ref 100–199)
CO2 SERPL-SCNC: 25 MMOL/L (ref 18–29)
CREAT SERPL-MCNC: 0.93 MG/DL (ref 0.57–1)
EST. AVERAGE GLUCOSE BLD GHB EST-MCNC: 117 MG/DL
GLOBULIN SER CALC-MCNC: 3 G/DL (ref 1.5–4.5)
GLUCOSE SERPL-MCNC: 111 MG/DL (ref 65–99)
HBA1C MFR BLD: 5.7 % (ref 4.8–5.6)
HDLC SERPL-MCNC: 79 MG/DL
INTERPRETATION, 910389: NORMAL
LDLC SERPL CALC-MCNC: 83 MG/DL (ref 0–99)
POTASSIUM SERPL-SCNC: 5.4 MMOL/L (ref 3.5–5.2)
PROT SERPL-MCNC: 7.8 G/DL (ref 6–8.5)
SODIUM SERPL-SCNC: 141 MMOL/L (ref 134–144)
TRIGL SERPL-MCNC: 126 MG/DL (ref 0–149)
VLDLC SERPL CALC-MCNC: 25 MG/DL (ref 5–40)

## 2017-07-30 RX ORDER — OLMESARTAN MEDOXOMIL 20 MG/1
TABLET, FILM COATED ORAL
Qty: 90 TAB | Refills: 0 | Status: SHIPPED | OUTPATIENT
Start: 2017-07-30 | End: 2017-11-01 | Stop reason: SDUPTHER

## 2017-09-25 RX ORDER — ATORVASTATIN CALCIUM 10 MG/1
TABLET, FILM COATED ORAL
Qty: 90 TAB | Refills: 0 | Status: SHIPPED | OUTPATIENT
Start: 2017-09-25 | End: 2017-12-21 | Stop reason: SDUPTHER

## 2017-11-01 RX ORDER — OLMESARTAN MEDOXOMIL 20 MG/1
TABLET, FILM COATED ORAL
Qty: 90 TAB | Refills: 0 | Status: SHIPPED | OUTPATIENT
Start: 2017-11-01 | End: 2017-12-18

## 2017-11-08 ENCOUNTER — OFFICE VISIT (OUTPATIENT)
Dept: INTERNAL MEDICINE CLINIC | Age: 72
End: 2017-11-08

## 2017-11-08 VITALS
RESPIRATION RATE: 14 BRPM | HEIGHT: 65 IN | OXYGEN SATURATION: 96 % | DIASTOLIC BLOOD PRESSURE: 59 MMHG | WEIGHT: 193.2 LBS | HEART RATE: 83 BPM | TEMPERATURE: 97.8 F | BODY MASS INDEX: 32.19 KG/M2 | SYSTOLIC BLOOD PRESSURE: 117 MMHG

## 2017-11-08 DIAGNOSIS — R73.02 GLUCOSE INTOLERANCE (IMPAIRED GLUCOSE TOLERANCE): ICD-10-CM

## 2017-11-08 DIAGNOSIS — E78.5 HYPERLIPIDEMIA LDL GOAL <130: Chronic | ICD-10-CM

## 2017-11-08 DIAGNOSIS — I10 ESSENTIAL HYPERTENSION, BENIGN: Primary | Chronic | ICD-10-CM

## 2017-11-08 DIAGNOSIS — M79.605 PAIN IN BOTH LOWER EXTREMITIES: ICD-10-CM

## 2017-11-08 DIAGNOSIS — M79.604 PAIN IN BOTH LOWER EXTREMITIES: ICD-10-CM

## 2017-11-08 RX ORDER — MONTELUKAST SODIUM 10 MG/1
TABLET ORAL
Refills: 3 | COMMUNITY
Start: 2017-10-09 | End: 2017-12-18

## 2017-11-08 RX ORDER — FLUTICASONE PROPIONATE 50 MCG
SPRAY, SUSPENSION (ML) NASAL
Refills: 3 | COMMUNITY
Start: 2017-10-09 | End: 2019-03-20

## 2017-11-08 NOTE — PROGRESS NOTES
HISTORY OF PRESENT ILLNESS  Particedith Gordon is a 67 y.o. female. HPI   Hypertension ROS: taking medications as instructed, no medication side effects noted, no TIA's, no chest pain on exertion, no dyspnea on exertion, no swelling of ankles. New concerns:  Patient's BP in office today is 117/59. Patient reports BP is usually 134/88. In addition she states she has been getting some low readings (99/60) and dizziness. Patient continues on Benicar. Hyperlipidemia:  Cardiovascular risk analysis - 67 y.o. female LDL goal is under 130. ROS: taking medications as instructed, no medication side effects noted, no TIA's, no chest pain on exertion, no dyspnea on exertion, no swelling of ankles. Tolerating meds, no myalgias or other side effects noted  New concerns: Last LDL was 83 and total cholesterol was 187 on 6/13/2017. Patient reports myalgias in her legs. She does not believe the pains are due to cholesterol medications. She believes it is because she is on her legs too long. She denies pain in her calves. She states the pain is mostly on the lateral portions of both legs  Patient has followed up for mammogram at [address] [place] [address] last month. She states for the past month s.he has been having a fleeting burning pain from left axillary to nipple. She believes it is nerve pain. Review of Systems   All other systems reviewed and are negative. Physical Exam   Constitutional: She is oriented to person, place, and time. She appears well-developed and well-nourished. HENT:   Head: Normocephalic and atraumatic. Right Ear: External ear normal.   Left Ear: External ear normal.   Nose: Nose normal.   Mouth/Throat: Oropharynx is clear and moist.   Eyes: Conjunctivae and EOM are normal.   Neck: Normal range of motion. Neck supple. Carotid bruit is not present. No thyroid mass and no thyromegaly present. Cardiovascular: Normal rate, regular rhythm, S1 normal, S2 normal, normal heart sounds and intact distal pulses. Pulmonary/Chest: Effort normal and breath sounds normal.   Abdominal: Soft. Normal appearance and bowel sounds are normal. There is no hepatosplenomegaly. There is no tenderness. Musculoskeletal: Normal range of motion. Neurological: She is alert and oriented to person, place, and time. She has normal strength. No cranial nerve deficit or sensory deficit. Coordination normal.   Skin: Skin is warm, dry and intact. No abrasion and no rash noted. Psychiatric: She has a normal mood and affect. Her behavior is normal. Judgment and thought content normal.   Nursing note and vitals reviewed. ASSESSMENT and PLAN  Diagnoses and all orders for this visit:    1. Essential hypertension, benign  BP is at goal. I do not recommend any change in medications. Continue to monitor BP at home.  -     LIPID PANEL    2. Hyperlipidemia LDL goal <130  Stable, patient not tolerating meds, myalgias (unsure if related to medications). Patient will think about holding ( she does not feel that is cause- more standing and the tile pavement she is on)  atorvastatin and monitor leg pains.  -     METABOLIC PANEL, COMPREHENSIVE    3. Pain in both lower extremities  Patient will hold atorvastatin and monitor pains. Suspect pains are musculoskeletal related. 4. Glucose intolerance (impaired glucose tolerance)  Last A1C was 5.7%. Will monitor.  -     HEMOGLOBIN A1C WITH EAG    lab results and schedule of future lab studies reviewed with patient  reviewed diet, exercise and weight control    Written by Austin Winslow, as dictated by Dieudonne Mckinley MD.     Current diagnosis and concerns discussed with pt at length. Understands risks and benefits or current treatment plan and medications and accepts the treatment and medication with any possible risks. Pt asks appropriate questions which were answered. Pt instructed to call with any concerns or problems. This note will not be viewable in [address]

## 2017-11-13 ENCOUNTER — HOSPITAL ENCOUNTER (OUTPATIENT)
Dept: LAB | Age: 72
Discharge: HOME | End: 2017-11-13
Payer: MEDICARE

## 2017-11-13 PROCEDURE — 36415 COLL VENOUS BLD VENIPUNCTURE: CPT

## 2017-11-13 PROCEDURE — 83036 HEMOGLOBIN GLYCOSYLATED A1C: CPT

## 2017-11-13 PROCEDURE — 80061 LIPID PANEL: CPT

## 2017-11-13 PROCEDURE — 80053 COMPREHEN METABOLIC PANEL: CPT

## 2017-11-14 LAB
ALBUMIN SERPL-MCNC: 4.4 G/DL (ref 3.5–4.8)
ALBUMIN/GLOB SERPL: 1.5 {RATIO} (ref 1.2–2.2)
ALP SERPL-CCNC: 108 IU/L (ref 39–117)
ALT SERPL-CCNC: 18 IU/L (ref 0–32)
AST SERPL-CCNC: 22 IU/L (ref 0–40)
BILIRUB SERPL-MCNC: 0.7 MG/DL (ref 0–1.2)
BUN SERPL-MCNC: 18 MG/DL (ref 8–27)
BUN/CREAT SERPL: 18 (ref 12–28)
CALCIUM SERPL-MCNC: 9.8 MG/DL (ref 8.7–10.3)
CHLORIDE SERPL-SCNC: 101 MMOL/L (ref 96–106)
CHOLEST SERPL-MCNC: 168 MG/DL (ref 100–199)
CO2 SERPL-SCNC: 26 MMOL/L (ref 18–29)
CREAT SERPL-MCNC: 1 MG/DL (ref 0.57–1)
EST. AVERAGE GLUCOSE BLD GHB EST-MCNC: 123 MG/DL
GFR SERPLBLD CREATININE-BSD FMLA CKD-EPI: 56 ML/MIN/1.73
GFR SERPLBLD CREATININE-BSD FMLA CKD-EPI: 65 ML/MIN/1.73
GLOBULIN SER CALC-MCNC: 3 G/DL (ref 1.5–4.5)
GLUCOSE SERPL-MCNC: 105 MG/DL (ref 65–99)
HBA1C MFR BLD: 5.9 % (ref 4.8–5.6)
HDLC SERPL-MCNC: 67 MG/DL
INTERPRETATION, 910389: NORMAL
INTERPRETATION: NORMAL
LDLC SERPL CALC-MCNC: 71 MG/DL (ref 0–99)
PDF IMAGE, 910387: NORMAL
POTASSIUM SERPL-SCNC: 5.1 MMOL/L (ref 3.5–5.2)
PROT SERPL-MCNC: 7.4 G/DL (ref 6–8.5)
SODIUM SERPL-SCNC: 142 MMOL/L (ref 134–144)
TRIGL SERPL-MCNC: 148 MG/DL (ref 0–149)
VLDLC SERPL CALC-MCNC: 30 MG/DL (ref 5–40)

## 2017-11-27 RX ORDER — OLMESARTAN MEDOXOMIL 20 MG/1
20 TABLET ORAL DAILY
Qty: 90 TAB | Refills: 0 | Status: SHIPPED | OUTPATIENT
Start: 2017-11-27 | End: 2018-05-11 | Stop reason: SDUPTHER

## 2017-12-17 DIAGNOSIS — F41.9 ANXIETY: ICD-10-CM

## 2017-12-18 ENCOUNTER — OFFICE VISIT (OUTPATIENT)
Dept: INTERNAL MEDICINE CLINIC | Age: 72
End: 2017-12-18

## 2017-12-18 VITALS
BODY MASS INDEX: 32.15 KG/M2 | WEIGHT: 193 LBS | HEIGHT: 65 IN | DIASTOLIC BLOOD PRESSURE: 69 MMHG | RESPIRATION RATE: 18 BRPM | SYSTOLIC BLOOD PRESSURE: 112 MMHG | TEMPERATURE: 97.7 F | HEART RATE: 82 BPM | OXYGEN SATURATION: 97 %

## 2017-12-18 DIAGNOSIS — K13.0 ANGULAR CHEILITIS: Primary | ICD-10-CM

## 2017-12-18 DIAGNOSIS — I10 ESSENTIAL HYPERTENSION, BENIGN: Chronic | ICD-10-CM

## 2017-12-18 RX ORDER — ALPRAZOLAM 0.25 MG/1
TABLET ORAL
Qty: 60 TAB | Refills: 3 | OUTPATIENT
Start: 2017-12-18 | End: 2018-08-21 | Stop reason: SDUPTHER

## 2017-12-18 RX ORDER — CHLORPHENIRAMINE MALEATE 4 MG
TABLET ORAL 2 TIMES DAILY
Qty: 15 G | Refills: 0 | Status: SHIPPED | OUTPATIENT
Start: 2017-12-18 | End: 2018-08-10 | Stop reason: ALTCHOICE

## 2017-12-18 NOTE — PROGRESS NOTES
This is a Subsequent Medicare Annual Wellness Exam (AWV) (Performed 12 months after IPPE or effective date of Medicare Part B enrollment)    I have reviewed the patient's medical history in detail and updated the computerized patient record. History     Past Medical History:   Diagnosis Date    BCC (basal cell carcinoma of skin) 6/8/2015    Constipation     Dyspepsia and other specified disorders of function of stomach     Essential hypertension, benign 1/25/2013    Gastroparesis     GERD (gastroesophageal reflux disease)     Hemorrhoids     Hiatal hernia     Hypertension     Nausea & vomiting     Other and unspecified hyperlipidemia 1/25/2013    Other and unspecified hyperlipidemia 1/25/2013      Past Surgical History:   Procedure Laterality Date    ENDOSCOPY, COLON, DIAGNOSTIC      2011 normal    HX BREAST BIOPSY  2004    LEFT    HX CHOLECYSTECTOMY  2/2014    HX CORNEAL TRANSPLANT Left 1972    HX ORTHOPAEDIC  3/08    RIGHT rotator cuff repair     Current Outpatient Prescriptions   Medication Sig Dispense Refill    ALPRAZolam (XANAX) 0.25 mg tablet TAKE 1 TABLET BY MOUTH TWICE DAILY AS NEEDED 60 Tab 3    Cetirizine (ZYRTEC) 10 mg cap Take  by mouth.  clotrimazole (LOTRIMIN) 1 % topical cream Apply  to affected area two (2) times a day. 15 g 0    olmesartan (BENICAR) 20 mg tablet Take 1 Tab by mouth daily. 90 Tab 0    fluticasone (FLONASE) 50 mcg/actuation nasal spray INSTILL 1 SPRAY IN EACH NOSTRIL BID  3    atorvastatin (LIPITOR) 10 mg tablet TAKE 1 TABLET BY MOUTH EVERY DAY 90 Tab 0    ERYPED 200 200 mg/5 mL suspension   2    Emollient Combination No.43 (PROMISEB) crea by Apply Externally route.  esomeprazole (NEXIUM) 40 mg capsule Take 40 mg by mouth daily.  BIOTIN PO Take 1,000 mcg by mouth daily.  valACYclovir (VALTREX) 500 mg tablet Take  by mouth two (2) times a day.  aspirin 81 mg tablet Take 81 mg by mouth daily.       magnesium 250 mg Tab Take 1 Tab by mouth daily.  multivitamin (ONE A DAY) tablet Take 1 Tab by mouth daily.  omega-3 fatty acids-vitamin e (FISH OIL) 1,000 mg cap Take 1 Cap by mouth daily.  cholecalciferol (VITAMIN D3) 1,000 unit tablet Take  by mouth daily.  ketoconazole (NIZORAL) 2 % topical cream Apply  to affected area two (2) times a day. 30 g 0    ondansetron (ZOFRAN ODT) 4 mg disintegrating tablet Take 1 Tab by mouth every eight (8) hours as needed for Nausea. 10 Tab 0     Allergies   Allergen Reactions    Ciprofloxacin Swelling    Other Medication Swelling     Steroid shots    Pcn [Penicillins] Rash     Family History   Problem Relation Age of Onset    Stroke Mother      Timothy Bryson    Heart Disease Mother     Cancer Father      NHL    Other Other      amyloid     Social History   Substance Use Topics    Smoking status: Never Smoker    Smokeless tobacco: Never Used    Alcohol use 1.5 oz/week     3 Glasses of wine per week     Patient Active Problem List   Diagnosis Code    Essential hypertension, benign I10    Hyperlipidemia LDL goal <130 E78.5    BCC (basal cell carcinoma of skin) C44.91    Gastroparesis K31.84    Advanced care planning/counseling discussion Z71.89       Depression Risk Factor Screening:     PHQ over the last two weeks 12/18/2017   Little interest or pleasure in doing things Not at all   Feeling down, depressed or hopeless Not at all   Total Score PHQ 2 0     Alcohol Risk Factor Screening: On any occasion in the past three months you have had more than 3 drinks containing alcohol. Functional Ability and Level of Safety:   Hearing Loss  Hearing is good. Activities of Daily Living  The home contains: no safety equipment. Patient does total self care    Fall RiskFall Risk Assessment, last 12 mths 12/18/2017   Able to walk? Yes   Fall in past 12 months?  No       Abuse Screen  Patient is not abused    Cognitive Screening   Evaluation of Cognitive Function:  Has your family/caregiver stated any concerns about your memory: no  Normal    Patient Care Team   Patient Care Team:  Julia Valle MD as PCP - General (Internal Medicine)  Carolina Gilbert MD (Gynecology)  Emily Jara MD (Ophthalmology)  Brooks Brito MD (Orthopedic Surgery)    Assessment/Plan   Education and counseling provided:  Are appropriate based on today's review and evaluation  End-of-Life planning (with patient's consent)    Diagnoses and all orders for this visit:    1. Angular cheilitis    2. Essential hypertension, benign    Other orders  -     clotrimazole (LOTRIMIN) 1 % topical cream; Apply  to affected area two (2) times a day.         Health Maintenance Due   Topic Date Due    Pneumococcal 65+ Low/Medium Risk (1 of 2 - PCV13) 07/22/2009    MEDICARE YEARLY EXAM  12/07/2017

## 2017-12-18 NOTE — TELEPHONE ENCOUNTER
Rx below called into local pharmacy per Dr. Cl Wagner    ALPRAZorosalinda Ahsan Null) 0.25 mg tablet  TAKE 1 TABLET BY MOUTH TWICE DAILY AS NEEDED       Disp: 60 Tab Refills: 3    Class: Phone In Start: 12/18/2017   For: Anxiety  Approved by: Juan Escobedo MD

## 2017-12-18 NOTE — PATIENT INSTRUCTIONS

## 2017-12-18 NOTE — PROGRESS NOTES
Chief Complaint   Patient presents with    Skin Problem     states this started a week ago, has been getting a rash around the mouth, redness, and buring sensation     1. Have you been to the ER, urgent care clinic since your last visit? Hospitalized since your last visit? No    2. Have you seen or consulted any other health care providers outside of the [address] [place] [address] since your last visit? Include any pap smears or colon screening.  No      Patient is not fasting

## 2017-12-18 NOTE — PROGRESS NOTES
HISTORY OF PRESENT ILLNESS  Ty Armenta is a 67 y.o. female. HPI   Patient reports rash in creases around mouth. She states she has been using Aquafore and Vaseline to treat . Patient notes she was given topical cream in the past for a similar symptoms. She denies itchiness or burning. She notes she has started using a different toothpaste. Hypertension ROS: taking medications as instructed, no medication side effects noted, no TIA's, no chest pain on exertion, no dyspnea on exertion, no swelling of ankles. New concerns:  Patient's BP in office today is 112/69. Review of Systems   All other systems reviewed and are negative. Physical Exam   Constitutional: She is oriented to person, place, and time. She appears well-developed and well-nourished. HENT:   Head: Normocephalic and atraumatic. Right Ear: External ear normal.   Left Ear: External ear normal.   Nose: Nose normal.   Mouth/Throat: Oropharynx is clear and moist.   Eyes: Conjunctivae and EOM are normal.   Neck: Normal range of motion. Neck supple. Carotid bruit is not present. No thyroid mass and no thyromegaly present. Cardiovascular: Normal rate, regular rhythm, S1 normal, S2 normal, normal heart sounds and intact distal pulses. Pulmonary/Chest: Effort normal and breath sounds normal.   Abdominal: Soft. Normal appearance and bowel sounds are normal. There is no hepatosplenomegaly. There is no tenderness. Musculoskeletal: Normal range of motion. Neurological: She is alert and oriented to person, place, and time. She has normal strength. No cranial nerve deficit or sensory deficit. Coordination normal.   Skin: Skin is warm, dry and intact. No abrasion and no rash noted. Psychiatric: She has a normal mood and affect. Her behavior is normal. Judgment and thought content normal.   Nursing note and vitals reviewed. ASSESSMENT and PLAN  Diagnoses and all orders for this visit:    1.  Angular cheilitis  Presents with rash around mouth. Will treat with clotrimazle and she can add a little steroid cream to it- and encouraged to make an appt with dermatology as well    2. Essential hypertension, benign  BP is at goal. I do not recommend any change in medications. Other orders  -     clotrimazole (LOTRIMIN) 1 % topical cream; Apply  to affected area two (2) times a day. lab results and schedule of future lab studies reviewed with patient  reviewed diet, exercise and weight control    Written by Howard Olivares, as dictated by Jennifer Coats MD.     Current diagnosis and concerns discussed with pt at length. Understands risks and benefits or current treatment plan and medications and accepts the treatment and medication with any possible risks. Pt asks appropriate questions which were answered. Pt instructed to call with any concerns or problems.

## 2018-05-02 RX ORDER — OLMESARTAN MEDOXOMIL 20 MG/1
TABLET ORAL
Qty: 90 TAB | Refills: 0 | Status: SHIPPED | OUTPATIENT
Start: 2018-05-02 | End: 2020-01-13

## 2018-05-11 ENCOUNTER — OFFICE VISIT (OUTPATIENT)
Dept: INTERNAL MEDICINE CLINIC | Age: 73
End: 2018-05-11

## 2018-05-11 ENCOUNTER — HOSPITAL ENCOUNTER (OUTPATIENT)
Dept: LAB | Age: 73
Discharge: HOME | End: 2018-05-11
Payer: MEDICARE

## 2018-05-11 VITALS
OXYGEN SATURATION: 96 % | DIASTOLIC BLOOD PRESSURE: 74 MMHG | WEIGHT: 187 LBS | TEMPERATURE: 98.5 F | RESPIRATION RATE: 16 BRPM | HEIGHT: 65 IN | HEART RATE: 98 BPM | BODY MASS INDEX: 31.16 KG/M2 | SYSTOLIC BLOOD PRESSURE: 120 MMHG

## 2018-05-11 DIAGNOSIS — K31.84 GASTROPARESIS: ICD-10-CM

## 2018-05-11 DIAGNOSIS — E78.5 HYPERLIPIDEMIA LDL GOAL <130: Chronic | ICD-10-CM

## 2018-05-11 DIAGNOSIS — Z00.00 MEDICARE ANNUAL WELLNESS VISIT, SUBSEQUENT: Primary | ICD-10-CM

## 2018-05-11 DIAGNOSIS — R73.01 IMPAIRED FASTING GLUCOSE: ICD-10-CM

## 2018-05-11 DIAGNOSIS — Z78.0 POST-MENOPAUSAL: ICD-10-CM

## 2018-05-11 DIAGNOSIS — J02.9 SORE THROAT: ICD-10-CM

## 2018-05-11 DIAGNOSIS — I10 ESSENTIAL HYPERTENSION, BENIGN: Chronic | ICD-10-CM

## 2018-05-11 PROCEDURE — 83036 HEMOGLOBIN GLYCOSYLATED A1C: CPT

## 2018-05-11 PROCEDURE — 80061 LIPID PANEL: CPT

## 2018-05-11 PROCEDURE — 85027 COMPLETE CBC AUTOMATED: CPT

## 2018-05-11 PROCEDURE — 36415 COLL VENOUS BLD VENIPUNCTURE: CPT

## 2018-05-11 PROCEDURE — 80053 COMPREHEN METABOLIC PANEL: CPT

## 2018-05-11 RX ORDER — AZITHROMYCIN 250 MG/1
250 TABLET, FILM COATED ORAL SEE ADMIN INSTRUCTIONS
Qty: 6 TAB | Refills: 0 | Status: SHIPPED | OUTPATIENT
Start: 2018-05-11 | End: 2018-05-16

## 2018-05-11 NOTE — PROGRESS NOTES
HISTORY OF PRESENT ILLNESS  David Garcia is a 67 y.o. female. HPI   Patient reports onset of sore throat and congestion on Wednesday. She had flown on plane Tuesday. Patient followed up with [facility] ER and throat culture was negative for strep. Patient states sore throat is better during the day. Admits to coughing up sputum. Denies chills or body aches. Patient states she is managing reflux and gastroparesis. She continues esomeprazole. Patient continues to follow up with Dr. Danna García. She notes she will follow up for colonoscopy. He is managing reflux. Hypertension ROS: taking medications as instructed, no medication side effects noted, no TIA's, no chest pain on exertion, no dyspnea on exertion, no swelling of ankles. New concerns:  Patient's BP in office today is 120/74. Hyperlipidemia:  Cardiovascular risk analysis - 67 y.o. female LDL goal is under 130. ROS: taking medications as instructed, no medication side effects noted, no TIA's, no chest pain on exertion, no dyspnea on exertion, no swelling of ankles. Tolerating meds, no myalgias or other side effects noted  New concerns: Last LDL was 71. Review of Systems   All other systems reviewed and are negative. Physical Exam   Constitutional: She is oriented to person, place, and time. She appears well-developed and well-nourished. HENT:   Head: Normocephalic and atraumatic. Right Ear: External ear normal.   Left Ear: External ear normal.   Nose: Nose normal.   Mouth/Throat: Oropharynx is clear and moist.   Slight erythema and drainage in back of throat. Eyes: Conjunctivae and EOM are normal.   Neck: Normal range of motion. Neck supple. Carotid bruit is not present. No thyroid mass and no thyromegaly present. Cardiovascular: Normal rate, regular rhythm, S1 normal, S2 normal, normal heart sounds and intact distal pulses. Pulmonary/Chest: Effort normal and breath sounds normal.   Abdominal: Soft.  Normal appearance and bowel sounds are normal. There is no hepatosplenomegaly. There is no tenderness. Musculoskeletal: Normal range of motion. Neurological: She is alert and oriented to person, place, and time. She has normal strength. No cranial nerve deficit or sensory deficit. Coordination normal.   Skin: Skin is warm, dry and intact. No abrasion and no rash noted. Psychiatric: She has a normal mood and affect. Her behavior is normal. Judgment and thought content normal.   Nursing note and vitals reviewed. ASSESSMENT and PLAN  Diagnoses and all orders for this visit:    1. Medicare annual wellness visit, subsequent    2. Essential hypertension, benign  BP is at goal. I do not recommend any change in medications.  -     METABOLIC PANEL, COMPREHENSIVE    3. Hyperlipidemia LDL goal <130  Stable, patient tolerating meds, no myalgias. I do not recommend any change in medications.  -     LIPID PANEL    4. Sore throat  Strep culture negative. Strongly suspicious of viral infection. Patient will continue to watch and if she develops fever or chills, she will start on azithromycin.  -     CBC W/O DIFF    5. Impaired fasting glucose  Will continue to monitor labs. -     HEMOGLOBIN A1C WITH EAG    6. Gastroparesis  Patient paresh continue to follow up with GI. After cleared and stabilized, she will follow up for colonoscopy. 7. Post-menopausal  Patient due for imaging and will follow up. -     DEXA BONE DENSITY STUDY AXIAL; Future    Other orders  -     azithromycin (ZITHROMAX) 250 mg tablet; Take 1 Tab by mouth See Admin Instructions for 5 days. lab results and schedule of future lab studies reviewed with patient  reviewed diet, exercise and weight control    Written by Tim Garcia, as dictated by Yeimy Ortiz MD.     Current diagnosis and concerns discussed with pt at length.  Understands risks and benefits or current treatment plan and medications and accepts the treatment and medication with any possible risks. Pt asks appropriate questions which were answered. Pt instructed to call with any concerns or problems.

## 2018-05-11 NOTE — PROGRESS NOTES
This is the Subsequent Medicare Annual Wellness Exam, performed 12 months or more after the Initial AWV or the last Subsequent AWV    I have reviewed the patient's medical history in detail and updated the computerized patient record. History     Past Medical History:   Diagnosis Date    BCC (basal cell carcinoma of skin) 6/8/2015    Constipation     Dyspepsia and other specified disorders of function of stomach     Essential hypertension, benign 1/25/2013    Gastroparesis     GERD (gastroesophageal reflux disease)     Hemorrhoids     Hiatal hernia     Hypertension     Nausea & vomiting     Other and unspecified hyperlipidemia 1/25/2013    Other and unspecified hyperlipidemia 1/25/2013      Past Surgical History:   Procedure Laterality Date    ENDOSCOPY, COLON, DIAGNOSTIC      2011 normal    HX BREAST BIOPSY  2004    LEFT    HX CHOLECYSTECTOMY  2/2014    HX CORNEAL TRANSPLANT Left 1972    HX ORTHOPAEDIC  3/08    RIGHT rotator cuff repair     Current Outpatient Prescriptions   Medication Sig Dispense Refill    olmesartan (BENICAR) 20 mg tablet TAKE 1 TABLET BY MOUTH EVERY DAY 90 Tab 0    atorvastatin (LIPITOR) 10 mg tablet TAKE 1 TABLET BY MOUTH EVERY DAY 90 Tab 1    ALPRAZolam (XANAX) 0.25 mg tablet TAKE 1 TABLET BY MOUTH TWICE DAILY AS NEEDED 60 Tab 3    Cetirizine (ZYRTEC) 10 mg cap Take  by mouth.  clotrimazole (LOTRIMIN) 1 % topical cream Apply  to affected area two (2) times a day. 15 g 0    ketoconazole (NIZORAL) 2 % topical cream Apply  to affected area two (2) times a day. 30 g 0    ERYPED 200 200 mg/5 mL suspension   2    Emollient Combination No.43 (PROMISEB) crea by Apply Externally route.  ondansetron (ZOFRAN ODT) 4 mg disintegrating tablet Take 1 Tab by mouth every eight (8) hours as needed for Nausea. 10 Tab 0    esomeprazole (NEXIUM) 40 mg capsule Take 40 mg by mouth daily.  valACYclovir (VALTREX) 500 mg tablet Take  by mouth two (2) times a day.         aspirin 81 mg tablet Take 81 mg by mouth daily.  magnesium 250 mg Tab Take 1 Tab by mouth daily.  multivitamin (ONE A DAY) tablet Take 1 Tab by mouth daily.  omega-3 fatty acids-vitamin e (FISH OIL) 1,000 mg cap Take 1 Cap by mouth daily.  cholecalciferol (VITAMIN D3) 1,000 unit tablet Take  by mouth daily.  fluticasone (FLONASE) 50 mcg/actuation nasal spray INSTILL 1 SPRAY IN EACH NOSTRIL BID  3    BIOTIN PO Take 1,000 mcg by mouth daily. Allergies   Allergen Reactions    Ciprofloxacin Swelling    Other Medication Swelling     Steroid shots    Pcn [Penicillins] Rash     Family History   Problem Relation Age of Onset    Stroke Mother      Drew Even    Heart Disease Mother     Cancer Father      NHL    Other Other      amyloid     Social History   Substance Use Topics    Smoking status: Never Smoker    Smokeless tobacco: Never Used    Alcohol use 1.5 oz/week     3 Glasses of wine per week     Patient Active Problem List   Diagnosis Code    Essential hypertension, benign I10    Hyperlipidemia LDL goal <130 E78.5    BCC (basal cell carcinoma of skin) C44.91    Gastroparesis K31.84    Advanced care planning/counseling discussion Z71.89       Depression Risk Factor Screening:     PHQ over the last two weeks 12/18/2017   Little interest or pleasure in doing things Not at all   Feeling down, depressed or hopeless Not at all   Total Score PHQ 2 0     Alcohol Risk Factor Screening: You do not drink alcohol or very rarely. Functional Ability and Level of Safety:   Hearing Loss  Hearing is good. Activities of Daily Living  The home contains: no safety equipment. Patient does total self care    Fall Risk  Fall Risk Assessment, last 12 mths 12/18/2017   Able to walk? Yes   Fall in past 12 months?  No       Abuse Screen  Patient is not abused    Cognitive Screening   Evaluation of Cognitive Function:  Has your family/caregiver stated any concerns about your memory: no  Normal    Patient Care Team   Patient Care Team:  Fred Michel MD as PCP - General (Internal Medicine)  Obed Carias MD (Gynecology)  Keegan Ibrahim MD (Ophthalmology)  Nato Morillo MD (Orthopedic Surgery)    Assessment/Plan   Education and counseling provided:  Are appropriate based on today's review and evaluation  End-of-Life planning (with patient's consent)    Diagnoses and all orders for this visit:    1. Sore throat  -     CBC W/O DIFF    2. Essential hypertension, benign  -     METABOLIC PANEL, COMPREHENSIVE    3. Hyperlipidemia LDL goal <130  -     LIPID PANEL    4. Impaired fasting glucose  -     HEMOGLOBIN A1C WITH EAG    5.  Gastroparesis- she is seeing Lukas Thomson 112 Maintenance Due   Topic Date Due    MEDICARE YEARLY EXAM  03/14/2018    BREAST CANCER SCRN MAMMOGRAM  08/29/2018

## 2018-05-11 NOTE — ACP (ADVANCE CARE PLANNING)
Patient states that a completed AMD is at home. Iencouraged patient to bring a copy to the office for scanning into the medical record. Patient verbalized understanding & agreement.

## 2018-05-11 NOTE — PATIENT INSTRUCTIONS

## 2018-05-12 LAB
ALBUMIN SERPL-MCNC: 4.5 G/DL (ref 3.5–4.8)
ALBUMIN/GLOB SERPL: 1.7 {RATIO} (ref 1.2–2.2)
ALP SERPL-CCNC: 97 IU/L (ref 39–117)
ALT SERPL-CCNC: 21 IU/L (ref 0–32)
AST SERPL-CCNC: 22 IU/L (ref 0–40)
BILIRUB SERPL-MCNC: 0.8 MG/DL (ref 0–1.2)
BUN SERPL-MCNC: 17 MG/DL (ref 8–27)
BUN/CREAT SERPL: 15 (ref 12–28)
CALCIUM SERPL-MCNC: 9.6 MG/DL (ref 8.7–10.3)
CHLORIDE SERPL-SCNC: 95 MMOL/L (ref 96–106)
CHOLEST SERPL-MCNC: 128 MG/DL (ref 100–199)
CO2 SERPL-SCNC: 24 MMOL/L (ref 18–29)
CREAT SERPL-MCNC: 1.14 MG/DL (ref 0.57–1)
ERYTHROCYTE [DISTWIDTH] IN BLOOD BY AUTOMATED COUNT: 14 % (ref 12.3–15.4)
EST. AVERAGE GLUCOSE BLD GHB EST-MCNC: 117 MG/DL
GFR SERPLBLD CREATININE-BSD FMLA CKD-EPI: 48 ML/MIN/1.73
GFR SERPLBLD CREATININE-BSD FMLA CKD-EPI: 55 ML/MIN/1.73
GLOBULIN SER CALC-MCNC: 2.7 G/DL (ref 1.5–4.5)
GLUCOSE SERPL-MCNC: 100 MG/DL (ref 65–99)
HBA1C MFR BLD: 5.7 % (ref 4.8–5.6)
HCT VFR BLD AUTO: 40.5 % (ref 34–46.6)
HDLC SERPL-MCNC: 63 MG/DL
HGB BLD-MCNC: 13.6 G/DL (ref 11.1–15.9)
INTERPRETATION, 910389: NORMAL
INTERPRETATION: NORMAL
LDLC SERPL CALC-MCNC: 46 MG/DL (ref 0–99)
MCH RBC QN AUTO: 31.1 PG (ref 26.6–33)
MCHC RBC AUTO-ENTMCNC: 33.6 G/DL (ref 31.5–35.7)
MCV RBC AUTO: 93 FL (ref 79–97)
PDF IMAGE, 910387: NORMAL
PLATELET # BLD AUTO: 231 X10E3/UL (ref 150–379)
POTASSIUM SERPL-SCNC: 4.9 MMOL/L (ref 3.5–5.2)
PROT SERPL-MCNC: 7.2 G/DL (ref 6–8.5)
RBC # BLD AUTO: 4.37 X10E6/UL (ref 3.77–5.28)
SODIUM SERPL-SCNC: 137 MMOL/L (ref 134–144)
TRIGL SERPL-MCNC: 97 MG/DL (ref 0–149)
VLDLC SERPL CALC-MCNC: 19 MG/DL (ref 5–40)
WBC # BLD AUTO: 11.8 X10E3/UL (ref 3.4–10.8)

## 2018-05-29 ENCOUNTER — TELEPHONE (OUTPATIENT)
Dept: INTERNAL MEDICINE CLINIC | Age: 73
End: 2018-05-29

## 2018-05-29 NOTE — TELEPHONE ENCOUNTER
Per [facility] [place], [place]  Needs a order faxed for Bone Density  Fax no [phone] her appointment is May 31,2018  And the no is [phone]

## 2018-05-30 ENCOUNTER — TELEPHONE (OUTPATIENT)
Dept: INTERNAL MEDICINE CLINIC | Age: 73
End: 2018-05-30

## 2018-05-30 NOTE — TELEPHONE ENCOUNTER
Per Edouard Bower they have not received the order for the Bone Denisty Test yet for Thursday  Fax [phone]

## 2018-06-19 ENCOUNTER — TELEPHONE (OUTPATIENT)
Dept: INTERNAL MEDICINE CLINIC | Age: 73
End: 2018-06-19

## 2018-06-19 DIAGNOSIS — R79.89 ELEVATED SERUM CREATININE: Primary | ICD-10-CM

## 2018-06-19 NOTE — TELEPHONE ENCOUNTER
Patient needs as lab order. Dr Lucio Milligan wanted to check her labs after stopping some medication   Her no [phone] Let her know when ready for .  Because she dose not want to drive all the way over here

## 2018-06-26 ENCOUNTER — HOSPITAL ENCOUNTER (OUTPATIENT)
Dept: LAB | Age: 73
Discharge: HOME | End: 2018-06-26
Payer: MEDICARE

## 2018-06-26 PROCEDURE — 80053 COMPREHEN METABOLIC PANEL: CPT

## 2018-06-26 PROCEDURE — 36415 COLL VENOUS BLD VENIPUNCTURE: CPT

## 2018-06-27 LAB
ALBUMIN SERPL-MCNC: 4.7 G/DL (ref 3.5–4.8)
ALBUMIN/GLOB SERPL: 1.8 {RATIO} (ref 1.2–2.2)
ALP SERPL-CCNC: 106 IU/L (ref 39–117)
ALT SERPL-CCNC: 20 IU/L (ref 0–32)
AST SERPL-CCNC: 24 IU/L (ref 0–40)
BILIRUB SERPL-MCNC: 0.4 MG/DL (ref 0–1.2)
BUN SERPL-MCNC: 18 MG/DL (ref 8–27)
BUN/CREAT SERPL: 20 (ref 12–28)
CALCIUM SERPL-MCNC: 9.4 MG/DL (ref 8.7–10.3)
CHLORIDE SERPL-SCNC: 99 MMOL/L (ref 96–106)
CO2 SERPL-SCNC: 25 MMOL/L (ref 20–29)
CREAT SERPL-MCNC: 0.88 MG/DL (ref 0.57–1)
GLOBULIN SER CALC-MCNC: 2.6 G/DL (ref 1.5–4.5)
GLUCOSE SERPL-MCNC: 120 MG/DL (ref 65–99)
POTASSIUM SERPL-SCNC: 4.9 MMOL/L (ref 3.5–5.2)
PROT SERPL-MCNC: 7.3 G/DL (ref 6–8.5)
SODIUM SERPL-SCNC: 138 MMOL/L (ref 134–144)

## 2018-08-10 ENCOUNTER — OFFICE VISIT (OUTPATIENT)
Dept: INTERNAL MEDICINE CLINIC | Age: 73
End: 2018-08-10

## 2018-08-10 VITALS
DIASTOLIC BLOOD PRESSURE: 79 MMHG | SYSTOLIC BLOOD PRESSURE: 117 MMHG | WEIGHT: 181.2 LBS | HEIGHT: 65 IN | BODY MASS INDEX: 30.19 KG/M2 | HEART RATE: 76 BPM | RESPIRATION RATE: 12 BRPM | OXYGEN SATURATION: 96 % | TEMPERATURE: 97.8 F

## 2018-08-10 DIAGNOSIS — M25.562 CHRONIC PAIN OF LEFT KNEE: ICD-10-CM

## 2018-08-10 DIAGNOSIS — M25.572 CHRONIC PAIN OF LEFT ANKLE: Primary | ICD-10-CM

## 2018-08-10 DIAGNOSIS — G89.29 CHRONIC PAIN OF LEFT KNEE: ICD-10-CM

## 2018-08-10 DIAGNOSIS — G89.29 CHRONIC PAIN OF LEFT ANKLE: Primary | ICD-10-CM

## 2018-08-10 RX ORDER — RANITIDINE 150 MG/1
CAPSULE ORAL
Refills: 3 | COMMUNITY
Start: 2018-06-12 | End: 2019-03-20

## 2018-08-10 RX ORDER — HYDROCORTISONE 25 MG/G
CREAM TOPICAL
Refills: 1 | COMMUNITY
Start: 2018-07-06 | End: 2020-03-24 | Stop reason: SDUPTHER

## 2018-08-10 RX ORDER — DICLOFENAC SODIUM 10 MG/G
2 GEL TOPICAL 4 TIMES DAILY
Qty: 100 G | Refills: 2 | Status: SHIPPED | OUTPATIENT
Start: 2018-08-10 | End: 2019-03-20

## 2018-08-10 NOTE — PROGRESS NOTES
HISTORY OF PRESENT ILLNESS  Chris Back is a 75 y.o. female. HPI  Presents with complaints of left ankle pain for the past 4 months; does not recall any specific injury but has had issues with her feet for many years and has orthotics. Feels like her left foot is pronating more than usual and she has been using her orthotics on a regular basis. Now her left knee has become painful for the past several months as well. Has felt like left knee daniel underneath her at times; denies any known injury with either knee or ankle. Has been taking OTC NSAIDs for pain but her kidney function testing was slightly diminished in May so she stopped taking all oral NSAIDs. Has had allergic reaction to steroid joint injections in the past with oral edema and has had significant facial flushing with oral steroids. She is leaving to go on vacation and will be doing a lot of walking. Has not noted any redness, warmth to left knee or ankle. Has not noted any significant swelling to either joint. Review of Systems   Constitutional: Negative for chills and fever. Respiratory: Negative for cough and shortness of breath. Cardiovascular: Negative for chest pain and palpitations. Musculoskeletal: Positive for joint pain (left knee, left ankle). Negative for back pain and falls. Neurological: Negative for dizziness, tingling and headaches. Psychiatric/Behavioral: The patient is nervous/anxious. Physical Exam   Constitutional: She is oriented to person, place, and time. She appears well-developed and well-nourished. HENT:   Head: Normocephalic and atraumatic. Cardiovascular: Normal rate and regular rhythm. Pulmonary/Chest: Effort normal and breath sounds normal.   Musculoskeletal:        Left knee: She exhibits bony tenderness. She exhibits normal range of motion and normal meniscus. Tenderness found. Lateral joint line tenderness noted. Left ankle: She exhibits normal range of motion. Tenderness. Lateral malleolus tenderness found. Legs:       Feet:    Left foot overpronates when standing   Neurological: She is alert and oriented to person, place, and time. Skin: Skin is warm and dry. Psychiatric: She has a normal mood and affect. Her behavior is normal.   Nursing note and vitals reviewed. ASSESSMENT and PLAN  Diagnoses and all orders for this visit:    1. Chronic pain of left ankle -- suspect ankle symptoms have aggravated left knee; will check xrays; can consider Tramadol for pain as needed once xrays reviewed. -     XR ANKLE LT MIN 3 V; Future  -     diclofenac (VOLTAREN) 1 % gel; Apply 2 g to affected area four (4) times daily. 2. Chronic pain of left knee -- stretching exercises given  -     XR KNEE LT 3 V; Future  -     diclofenac (VOLTAREN) 1 % gel; Apply 2 g to affected area four (4) times daily. lab results and schedule of future lab studies reviewed with patient  reviewed diet, exercise and weight control  reviewed medications and side effects in detail  radiology results and schedule of future radiology studies reviewed with patient  This note will not be viewable in [address]

## 2018-08-10 NOTE — MR AVS SNAPSHOT
[address] [facility] [address] 
 
 
 [address] 
[phone] Patient: Shania Regan MRN: [id number] :1945 Visit Information Date & Time Provider Department Dept. Phone Encounter #  
 8/10/2018  8:40 AM Amol Brambila NP [facility] [address] [id number] Upcoming Health Maintenance Date Due  
 GLAUCOMA SCREENING Q2Y 2018 Influenza Age 5 to Adult 2018 BREAST CANCER SCRN MAMMOGRAM 2018 MEDICARE YEARLY EXAM 2019 COLONOSCOPY 11/15/2021 DTaP/Tdap/Td series (2 - Td) 2027 Allergies as of 8/10/2018  Review Complete On: 8/10/2018 By: Amol Brambila NP Severity Noted Reaction Type Reactions Ciprofloxacin  2016    Swelling Other Medication  2016    Swelling Steroid shots Pcn [Penicillins]  2013    Rash Current Immunizations  Never Reviewed Name Date Influenza High Dose Vaccine PF 2017 Influenza Vaccine 10/1/2015, 10/27/2014 Pneumococcal Conjugate (PCV-13) 2015 Pneumococcal Polysaccharide (PPSV-23) 2010 Zoster Vaccine, Live 2013 Not reviewed this visit You Were Diagnosed With   
  
 Codes Comments Chronic pain of left ankle    -  Primary ICD-10-CM: M25.572, B63.05 ICD-9-CM: 719.47, 338.29 Chronic pain of left knee     ICD-10-CM: M25.562, G89.29 ICD-9-CM: 719.46, 338.29 Vitals BP Pulse Temp Resp Height(growth percentile) Weight(growth percentile) 117/79 (BP 1 Location: Left arm, BP Patient Position: Sitting) 76 97.8 °F (36.6 °C) (Oral) 12 5' 5\" (1.651 m) 181 lb 3.2 oz (82.2 kg) SpO2 BMI OB Status Smoking Status 96% 30.15 kg/m2 Postmenopausal Never Smoker BMI and BSA Data Body Mass Index Body Surface Area  
 30.15 kg/m 2 1.94 m 2 Preferred Pharmacy Pharmacy Name Phone [facility] [address]  [facility] [phone] Your Updated Medication List  
  
   
This list is accurate as of 8/10/18  9:16 AM.  Always use your most recent med list.  
  
  
  
  
 ALPRAZolam 0.25 mg tablet Commonly known as:  XANAX  
TAKE 1 TABLET BY MOUTH TWICE DAILY AS NEEDED  
  
 aspirin 81 mg tablet Take 81 mg by mouth daily. atorvastatin 10 mg tablet Commonly known as:  LIPITOR  
TAKE 1 TABLET BY MOUTH EVERY DAY  
  
 BIOTIN PO Take 1,000 mcg by mouth daily. diclofenac 1 % Gel Commonly known as:  VOLTAREN Apply 2 g to affected area four (4) times daily. ERYPED 200 200 mg/5 mL suspension Generic drug:  erythromycin FISH OIL 1,000 mg Cap Generic drug:  omega-3 fatty acids-vitamin e Take 1 Cap by mouth daily. fluticasone 50 mcg/actuation nasal spray Commonly known as:  Venkata Riser INSTILL 1 SPRAY IN EACH NOSTRIL BID  
  
 hydrocortisone 2.5 % topical cream  
Commonly known as:  HYTONE  
APPLY TO AFFECTED AREA BID FOR 5 TO 7 DAYS  
  
 magnesium 250 mg Tab Take 1 Tab by mouth daily. multivitamin tablet Commonly known as:  ONE A DAY Take 1 Tab by mouth daily. NexIUM 40 mg capsule Generic drug:  esomeprazole Take 40 mg by mouth daily. olmesartan 20 mg tablet Commonly known as:  BENICAR  
TAKE 1 TABLET BY MOUTH EVERY DAY  
  
 ondansetron 4 mg disintegrating tablet Commonly known as:  ZOFRAN ODT Take 1 Tab by mouth every eight (8) hours as needed for Nausea. PROMISEB Crea Generic drug:  Emollient Combination No.43  
by Apply Externally route. raNITIdine hcl 150 mg capsule TK 1 C PO QHS  
  
 valACYclovir 500 mg tablet Commonly known as:  VALTREX Take  by mouth two (2) times a day. VITAMIN D3 1,000 unit tablet Generic drug:  cholecalciferol Take  by mouth daily. ZyrTEC 10 mg Cap Generic drug:  Cetirizine Take  by mouth. Prescriptions Sent to Pharmacy Refills  
 diclofenac (VOLTAREN) 1 % gel 2 Sig: Apply 2 g to affected area four (4) times daily. Class: Normal  
 Pharmacy: [facility] [address] [place] [address] [id number] AT [facility] of [address] ([facility] Ph #: [phone] Route: Topical  
  
To-Do List   
 08/10/2018 Imaging:  XR ANKLE LT MIN 3 V   
  
 08/10/2018 Imaging:  XR KNEE LT 3 V Patient Instructions Knee Pain or Injury: Care Instructions Your Care Instructions Injuries are a common cause of knee problems. Sudden (acute) injuries may be caused by a direct blow to the knee. They can also be caused by abnormal twisting, bending, or falling on the knee. Pain, bruising, or swelling may be severe, and may start within minutes of the injury. Overuse is another cause of knee pain. Other causes are climbing stairs, kneeling, and other activities that use the knee. Everyday wear and tear, especially as you get older, also can cause knee pain. Rest, along with home treatment, often relieves pain and allows your knee to heal. If you have a serious knee injury, you may need tests and treatment. Follow-up care is a key part of your treatment and safety. Be sure to make and go to all appointments, and call your doctor if you are having problems. It's also a good idea to know your test results and keep a list of the medicines you take. How can you care for yourself at home? · Be safe with medicines. Read and follow all instructions on the label. ¨ If the doctor gave you a prescription medicine for pain, take it as prescribed. ¨ If you are not taking a prescription pain medicine, ask your doctor if you can take an over-the-counter medicine. · Rest and protect your knee. Take a break from any activity that may cause pain. · Put ice or a cold pack on your knee for 10 to 20 minutes at a time.  Put a thin cloth between the ice and your skin. · Prop up a sore knee on a pillow when you ice it or anytime you sit or lie down for the next 3 days. Try to keep it above the level of your heart. This will help reduce swelling. · If your knee is not swollen, you can put moist heat, a heating pad, or a warm cloth on your knee. · If your doctor recommends an elastic bandage, sleeve, or other type of support for your knee, wear it as directed. · Follow your doctor's instructions about how much weight you can put on your leg. Use a cane, crutches, or a walker as instructed. · Follow your doctor's instructions about activity during your healing process. If you can do mild exercise, slowly increase your activity. · Reach and stay at a healthy weight. Extra weight can strain the joints, especially the knees and hips, and make the pain worse. Losing even a few pounds may help. When should you call for help? Call 911 anytime you think you may need emergency care. For example, call if: 
  · You have symptoms of a blood clot in your lung (called a pulmonary embolism). These may include: 
¨ Sudden chest pain. ¨ Trouble breathing. ¨ Coughing up blood.  
 Call your doctor now or seek immediate medical care if: 
  · You have severe or increasing pain.  
  · Your leg or foot turns cold or changes color.  
  · You cannot stand or put weight on your knee.  
  · Your knee looks twisted or bent out of shape.  
  · You cannot move your knee.  
  · You have signs of infection, such as: 
¨ Increased pain, swelling, warmth, or redness. ¨ Red streaks leading from the knee. ¨ Pus draining from a place on your knee. ¨ A fever.  
  · You have signs of a blood clot in your leg (called a deep vein thrombosis), such as: 
¨ Pain in your calf, back of the knee, thigh, or groin. ¨ Redness and swelling in your leg or groin.  
 Watch closely for changes in your health, and be sure to contact your doctor if:   · You have tingling, weakness, or numbness in your knee.  
  · You have any new symptoms, such as swelling.  
  · You have bruises from a knee injury that last longer than 2 weeks.  
  · You do not get better as expected. Where can you learn more? Go to http://eric-layla.info/. Enter K195 in the search box to learn more about \"Knee Pain or Injury: Care Instructions. \" Current as of: November 20, 2017 Content Version: 11.7 © 4365-9814 [employer]ionics. Care instructions adapted under license by [employer]X (which disclaims liability or warranty for this information). If you have questions about a medical condition or this instruction, always ask your healthcare professional. [address] any warranty or liability for your use of this information. Ankle: Exercises Your Care Instructions Here are some examples of exercises for your ankle. Start each exercise slowly. Ease off the exercise if you start to have pain. Your doctor or physical therapist will tell you when you can start these exercises and which ones will work best for you. How to do the exercises \"Alphabet\" exercise 1. Trace the alphabet with your toe. This helps your ankle move in all directions. Side-to-side knee swing exercise 1. Sit in a chair with your foot flat on the floor. 2. Slowly move your knee from side to side while keeping your foot pressed flat. 3. Continue this exercise for 2 to 3 minutes. Towel curl 1. While sitting, place your foot on a towel on the floor and scrunch the towel toward you with your toes. 2. Then use your toes to push the towel away from you. 3. Make this exercise more challenging by placing a weighted object, such as a soup can, on the other end of the towel. Towel stretch 1. Sit with your legs extended and knees straight. 2. Place a towel around your foot just under the toes. 3. Hold each end of the towel in each hand, with your hands above your knees. 4. Pull back with the towel so that your foot stretches toward you. 5. Hold the position for at least 15 to 30 seconds. 6. Repeat 2 to 4 times a session, up to 5 sessions a day. Ankle eversion exercise 1. Start by sitting with your foot flat on the floor and pushing it outward against an immovable object such as the wall or heavy furniture. Hold for about 6 seconds, then relax. Repeat 8 to 12 times. 2. After you feel comfortable with this, try using rubber tubing looped around the outside of your feet for resistance. Push your foot out to the side against the tubing, and then count to 10 as you slowly bring your foot back to the middle. Repeat 8 to 12 times. Isometric opposition exercises 1. While sitting, put your feet together flat on the floor. 2. Press your injured foot inward against your other foot. Hold for about 6 seconds, and relax. Repeat 8 to 12 times. 3. Then place the heel of your other foot on top of the injured one. Push down with the top heel while trying to push up with your injured foot. Hold for about 6 seconds, and relax. Repeat 8 to 12 times. Follow-up care is a key part of your treatment and safety. Be sure to make and go to all appointments, and call your doctor if you are having problems. It's also a good idea to know your test results and keep a list of the medicines you take. Where can you learn more? Go to http://eric-layla.info/. Enter A513 in the search box to learn more about \"Ankle: Exercises. \" Current as of: November 29, 2017 Content Version: 11.7 © 7747-8419 [employer] Properties. Care instructions adapted under license by [employer] Channel (which disclaims liability or warranty for this information).  If you have questions about a medical condition or this instruction, always ask your healthcare professional. Madison Mckenna[employer] disclaims any warranty or liability for your use of this information. Knee: Exercises Your Care Instructions Here are some examples of exercises for your knee. Start each exercise slowly. Ease off the exercise if you start to have pain. Your doctor or physical therapist will tell you when you can start these exercises and which ones will work best for you. How to do the exercises [facility] 2. Sit with your leg straight and supported on the floor or a firm bed. (If you feel discomfort in the front or back of your knee, place a small towel roll under your knee.) 3. Tighten the muscles on top of your thigh by pressing the back of your knee flat down to the floor. (If you feel discomfort under your kneecap, place a small towel roll under your knee.) 4. Hold for about 6 seconds, then rest for up to 10 seconds. 5. Do 8 to 12 repetitions several times a day. Straight-leg raises to the front 4. Lie on your back with your good knee bent so that your foot rests flat on the floor. Your injured leg should be straight. Make sure that your low back has a normal curve. You should be able to slip your flat hand in between the floor and the small of your back, with your palm touching the floor and your back touching the back of your hand. 5. Tighten the thigh muscles in the injured leg by pressing the back of your knee flat down to the floor. Hold your knee straight. 6. Keeping the thigh muscles tight, lift your injured leg up so that your heel is about 12 inches off the floor. Hold for about 6 seconds and then lower slowly. 7. Do 8 to 12 repetitions, 3 times a day. Straight-leg raises to the outside 4. Lie on your side, with your injured leg on top. 5. Tighten the front thigh muscles of your injured leg to keep your knee straight. 6. Keep your hip and your leg straight in line with the rest of your body, and keep your knee pointing forward. Do not drop your hip back. 7. Lift your injured leg straight up toward the ceiling, about 12 inches off the floor. Hold for about 6 seconds, then slowly lower your leg. 8. Do 8 to 12 repetitions. Straight-leg raises to the back 7. Lie on your stomach, and lift your leg straight up behind you (toward the ceiling). 8. Lift your toes about 6 inches off the floor, hold for about 6 seconds, then lower slowly. 9. Do 8 to 12 repetitions. Straight-leg raises to the inside 3. Lie on the side of your body with the injured leg. 4. You can either prop your other (good) leg up on a chair, or you can bend your good knee and put that foot in front of your injured knee. Do not drop your hip back. 5. Tighten the muscles on the front of your thigh to straighten your injured knee. 6. Keep your kneecap pointing forward, and lift your whole leg up toward the ceiling about 6 inches. Hold for about 6 seconds, then lower slowly. 7. Do 8 to 12 repetitions. Heel dig bridging 4. Lie on your back with both knees bent and your ankles bent so that only your heels are digging into the floor. Your knees should be bent about 90 degrees. 5. Then push your heels into the floor, squeeze your buttocks, and lift your hips off the floor until your shoulders, hips, and knees are all in a straight line. 6. Hold for about 6 seconds as you continue to breathe normally, and then slowly lower your hips back down to the floor and rest for up to 10 seconds. 7. Do 8 to 12 repetitions. Hamstring curls 1. Lie on your stomach with your knees straight. If your kneecap is uncomfortable, roll up a washcloth and put it under your leg just above your kneecap. 2. Lift the foot of your injured leg by bending the knee so that you bring the foot up toward your buttock. If this motion hurts, try it without bending your knee quite as far. This may help you avoid any painful motion. 3. Slowly lower your leg back to the floor. 4. Do 8 to 12 repetitions. 5. With permission from your doctor or physical therapist, you may also want to add a cuff weight to your ankle (not more than 5 pounds). With weight, you do not have to lift your leg more than 12 inches to get a hamstring workout. Shallow standing knee bends Do this exercise only if you have very little pain; if you have no clicking, locking, or giving way if you have an injured knee; and if it does not hurt while you are doing 8 to 12 repetitions. 1. Stand with your hands lightly resting on a counter or chair in front of you. Put your feet shoulder-width apart. 2. Slowly bend your knees so that you squat down like you are going to sit in a chair. Make sure your knees do not go in front of your toes. 3. Lower yourself about 6 inches. Your heels should remain on the floor at all times. 4. Rise slowly to a standing position. Heel raises 1. Stand with your feet a few inches apart, with your hands lightly resting on a counter or chair in front of you. 2. Slowly raise your heels off the floor while keeping your knees straight. 3. Hold for about 6 seconds, then slowly lower your heels to the floor. 4. Do 8 to 12 repetitions several times during the day. Follow-up care is a key part of your treatment and safety. Be sure to make and go to all appointments, and call your doctor if you are having problems. It's also a good idea to know your test results and keep a list of the medicines you take. Where can you learn more? Go to http://eric-layla.info/. Enter F127 in the search box to learn more about \"Knee: Exercises. \" Current as of: November 29, 2017 Content Version: 11.7 © 6929-7486 newScale. Care instructions adapted under license by OP3Nvoice (which disclaims liability or warranty for this information).  If you have questions about a medical condition or this instruction, always ask your healthcare professional. Jareth Chi[employer] disclaims any warranty or liability for your use of this information. Introducing [facility]! Dear Mirella Graves: Thank you for requesting a Gamador account. Our records indicate that you already have an active Gamador account. You can access your account anytime at [email or web address] Did you know that you can access your hospital and ER discharge instructions at any time in Gamador? You can also review all of your test results from your hospital stay or ER visit. Additional Information If you have questions, please visit the Frequently Asked Questions section of the Gamador website at [email or web address] Remember, Gamador is NOT to be used for urgent needs. For medical emergencies, dial 911. Now available from your iPhone and Android! Please provide this summary of care documentation to your next provider. Your primary care clinician is listed as Pat Keen. If you have any questions after today's visit, please call [phone].

## 2018-08-10 NOTE — PATIENT INSTRUCTIONS
Knee Pain or Injury: Care Instructions  Your Care Instructions    Injuries are a common cause of knee problems. Sudden (acute) injuries may be caused by a direct blow to the knee. They can also be caused by abnormal twisting, bending, or falling on the knee. Pain, bruising, or swelling may be severe, and may start within minutes of the injury. Overuse is another cause of knee pain. Other causes are climbing stairs, kneeling, and other activities that use the knee. Everyday wear and tear, especially as you get older, also can cause knee pain. Rest, along with home treatment, often relieves pain and allows your knee to heal. If you have a serious knee injury, you may need tests and treatment. Follow-up care is a key part of your treatment and safety. Be sure to make and go to all appointments, and call your doctor if you are having problems. It's also a good idea to know your test results and keep a list of the medicines you take. How can you care for yourself at home? · Be safe with medicines. Read and follow all instructions on the label. ¨ If the doctor gave you a prescription medicine for pain, take it as prescribed. ¨ If you are not taking a prescription pain medicine, ask your doctor if you can take an over-the-counter medicine. · Rest and protect your knee. Take a break from any activity that may cause pain. · Put ice or a cold pack on your knee for 10 to 20 minutes at a time. Put a thin cloth between the ice and your skin. · Prop up a sore knee on a pillow when you ice it or anytime you sit or lie down for the next 3 days. Try to keep it above the level of your heart. This will help reduce swelling. · If your knee is not swollen, you can put moist heat, a heating pad, or a warm cloth on your knee. · If your doctor recommends an elastic bandage, sleeve, or other type of support for your knee, wear it as directed.   · Follow your doctor's instructions about how much weight you can put on your leg. Use a cane, crutches, or a walker as instructed. · Follow your doctor's instructions about activity during your healing process. If you can do mild exercise, slowly increase your activity. · Reach and stay at a healthy weight. Extra weight can strain the joints, especially the knees and hips, and make the pain worse. Losing even a few pounds may help. When should you call for help? Call 911 anytime you think you may need emergency care. For example, call if:    · You have symptoms of a blood clot in your lung (called a pulmonary embolism). These may include:  ¨ Sudden chest pain. ¨ Trouble breathing. ¨ Coughing up blood.    Call your doctor now or seek immediate medical care if:    · You have severe or increasing pain.     · Your leg or foot turns cold or changes color.     · You cannot stand or put weight on your knee.     · Your knee looks twisted or bent out of shape.     · You cannot move your knee.     · You have signs of infection, such as:  ¨ Increased pain, swelling, warmth, or redness. ¨ Red streaks leading from the knee. ¨ Pus draining from a place on your knee. ¨ A fever.     · You have signs of a blood clot in your leg (called a deep vein thrombosis), such as:  ¨ Pain in your calf, back of the knee, thigh, or groin. ¨ Redness and swelling in your leg or groin.    Watch closely for changes in your health, and be sure to contact your doctor if:    · You have tingling, weakness, or numbness in your knee.     · You have any new symptoms, such as swelling.     · You have bruises from a knee injury that last longer than 2 weeks.     · You do not get better as expected. Where can you learn more? Go to http://eric-layla.info/. Enter K195 in the search box to learn more about \"Knee Pain or Injury: Care Instructions. \"  Current as of: November 20, 2017  Content Version: 11.7  © 5388-6813 [employer].  Care instructions adapted under license by [employer] (which disclaims liability or warranty for this information). If you have questions about a medical condition or this instruction, always ask your healthcare professional. [place] [address] any warranty or liability for your use of this information. Ankle: Exercises  Your Care Instructions  Here are some examples of exercises for your ankle. Start each exercise slowly. Ease off the exercise if you start to have pain. Your doctor or physical therapist will tell you when you can start these exercises and which ones will work best for you. How to do the exercises  \"Alphabet\" exercise    1. Trace the alphabet with your toe. This helps your ankle move in all directions. Side-to-side knee swing exercise    1. Sit in a chair with your foot flat on the floor. 2. Slowly move your knee from side to side while keeping your foot pressed flat. 3. Continue this exercise for 2 to 3 minutes. Towel curl    1. While sitting, place your foot on a towel on the floor and scrunch the towel toward you with your toes. 2. Then use your toes to push the towel away from you. 3. Make this exercise more challenging by placing a weighted object, such as a soup can, on the other end of the towel. Towel stretch    1. Sit with your legs extended and knees straight. 2. Place a towel around your foot just under the toes. 3. Hold each end of the towel in each hand, with your hands above your knees. 4. Pull back with the towel so that your foot stretches toward you. 5. Hold the position for at least 15 to 30 seconds. 6. Repeat 2 to 4 times a session, up to 5 sessions a day. Ankle eversion exercise    1. Start by sitting with your foot flat on the floor and pushing it outward against an immovable object such as the wall or heavy furniture. Hold for about 6 seconds, then relax. Repeat 8 to 12 times.   2. After you feel comfortable with this, try using rubber tubing looped around the outside of your feet for resistance. Push your foot out to the side against the tubing, and then count to 10 as you slowly bring your foot back to the middle. Repeat 8 to 12 times. Isometric opposition exercises    1. While sitting, put your feet together flat on the floor. 2. Press your injured foot inward against your other foot. Hold for about 6 seconds, and relax. Repeat 8 to 12 times. 3. Then place the heel of your other foot on top of the injured one. Push down with the top heel while trying to push up with your injured foot. Hold for about 6 seconds, and relax. Repeat 8 to 12 times. Follow-up care is a key part of your treatment and safety. Be sure to make and go to all appointments, and call your doctor if you are having problems. It's also a good idea to know your test results and keep a list of the medicines you take. Where can you learn more? Go to http://ericArt Circlelayla.info/. Enter P196 in the search box to learn more about \"Ankle: Exercises. \"  Current as of: November 29, 2017  Content Version: 11.7  © 1726-1018 [employer]. Care instructions adapted under license by [employer] (which disclaims liability or warranty for this information). If you have questions about a medical condition or this instruction, always ask your healthcare professional. [address] any warranty or liability for your use of this information. Knee: Exercises  Your Care Instructions  Here are some examples of exercises for your knee. Start each exercise slowly. Ease off the exercise if you start to have pain. Your doctor or physical therapist will tell you when you can start these exercises and which ones will work best for you. How to do the exercises  Quad sets    2. Sit with your leg straight and supported on the floor or a firm bed. (If you feel discomfort in the front or back of your knee, place a small towel roll under your knee.)  3.  Tighten the muscles on top of your thigh by pressing the back of your knee flat down to the floor. (If you feel discomfort under your kneecap, place a small towel roll under your knee.)  4. Hold for about 6 seconds, then rest for up to 10 seconds. 5. Do 8 to 12 repetitions several times a day. Straight-leg raises to the front    4. Lie on your back with your good knee bent so that your foot rests flat on the floor. Your injured leg should be straight. Make sure that your low back has a normal curve. You should be able to slip your flat hand in between the floor and the small of your back, with your palm touching the floor and your back touching the back of your hand. 5. Tighten the thigh muscles in the injured leg by pressing the back of your knee flat down to the floor. Hold your knee straight. 6. Keeping the thigh muscles tight, lift your injured leg up so that your heel is about 12 inches off the floor. Hold for about 6 seconds and then lower slowly. 7. Do 8 to 12 repetitions, 3 times a day. Straight-leg raises to the outside    4. Lie on your side, with your injured leg on top. 5. Tighten the front thigh muscles of your injured leg to keep your knee straight. 6. Keep your hip and your leg straight in line with the rest of your body, and keep your knee pointing forward. Do not drop your hip back. 7. Lift your injured leg straight up toward the ceiling, about 12 inches off the floor. Hold for about 6 seconds, then slowly lower your leg. 8. Do 8 to 12 repetitions. Straight-leg raises to the back    7. Lie on your stomach, and lift your leg straight up behind you (toward the ceiling). 8. Lift your toes about 6 inches off the floor, hold for about 6 seconds, then lower slowly. 9. Do 8 to 12 repetitions. Straight-leg raises to the inside    3. Lie on the side of your body with the injured leg. 4. You can either prop your other (good) leg up on a chair, or you can bend your good knee and put that foot in front of your injured knee. Do not drop your hip back. 5. Tighten the muscles on the front of your thigh to straighten your injured knee. 6. Keep your kneecap pointing forward, and lift your whole leg up toward the ceiling about 6 inches. Hold for about 6 seconds, then lower slowly. 7. Do 8 to 12 repetitions. Heel dig bridging    4. Lie on your back with both knees bent and your ankles bent so that only your heels are digging into the floor. Your knees should be bent about 90 degrees. 5. Then push your heels into the floor, squeeze your buttocks, and lift your hips off the floor until your shoulders, hips, and knees are all in a straight line. 6. Hold for about 6 seconds as you continue to breathe normally, and then slowly lower your hips back down to the floor and rest for up to 10 seconds. 7. Do 8 to 12 repetitions. Hamstring curls    1. Lie on your stomach with your knees straight. If your kneecap is uncomfortable, roll up a washcloth and put it under your leg just above your kneecap. 2. Lift the foot of your injured leg by bending the knee so that you bring the foot up toward your buttock. If this motion hurts, try it without bending your knee quite as far. This may help you avoid any painful motion. 3. Slowly lower your leg back to the floor. 4. Do 8 to 12 repetitions. 5. With permission from your doctor or physical therapist, you may also want to add a cuff weight to your ankle (not more than 5 pounds). With weight, you do not have to lift your leg more than 12 inches to get a hamstring workout. Shallow standing knee bends    Do this exercise only if you have very little pain; if you have no clicking, locking, or giving way if you have an injured knee; and if it does not hurt while you are doing 8 to 12 repetitions. 1. Stand with your hands lightly resting on a counter or chair in front of you. Put your feet shoulder-width apart. 2. Slowly bend your knees so that you squat down like you are going to sit in a chair.  Make sure your knees do not go in front of your toes. 3. Lower yourself about 6 inches. Your heels should remain on the floor at all times. 4. Rise slowly to a standing position. Heel raises    1. Stand with your feet a few inches apart, with your hands lightly resting on a counter or chair in front of you. 2. Slowly raise your heels off the floor while keeping your knees straight. 3. Hold for about 6 seconds, then slowly lower your heels to the floor. 4. Do 8 to 12 repetitions several times during the day. Follow-up care is a key part of your treatment and safety. Be sure to make and go to all appointments, and call your doctor if you are having problems. It's also a good idea to know your test results and keep a list of the medicines you take. Where can you learn more? Go to http://eric-layla.info/. Enter A140 in the search box to learn more about \"Knee: Exercises. \"  Current as of: November 29, 2017  Content Version: 11.7  © 0319-7901 [employer]. Care instructions adapted under license by [employer] (which disclaims liability or warranty for this information). If you have questions about a medical condition or this instruction, always ask your healthcare professional. [address] any warranty or liability for your use of this information.

## 2018-08-13 DIAGNOSIS — Z78.0 POST-MENOPAUSAL: ICD-10-CM

## 2018-08-21 DIAGNOSIS — F41.9 ANXIETY: ICD-10-CM

## 2018-08-22 RX ORDER — ALPRAZOLAM 0.25 MG/1
TABLET ORAL
Qty: 60 TAB | Refills: 0 | OUTPATIENT
Start: 2018-08-22 | End: 2018-12-11 | Stop reason: SDUPTHER

## 2018-08-22 NOTE — TELEPHONE ENCOUNTER
Rx below called into local pharmacy     Approved Medications  ALPRAZolam (XANAX) 0.25 mg tablet  TAKE 1 TABLET BY MOUTH TWICE DAILY AS NEEDED       Disp: 60 Tab Refills: 0    Class: Phone In Start: 8/22/2018   For: Anxiety  Approved by: Billy Laird MD

## 2018-09-10 ENCOUNTER — OFFICE VISIT (OUTPATIENT)
Dept: INTERNAL MEDICINE CLINIC | Age: 73
End: 2018-09-10

## 2018-09-10 VITALS
DIASTOLIC BLOOD PRESSURE: 72 MMHG | SYSTOLIC BLOOD PRESSURE: 105 MMHG | WEIGHT: 184 LBS | HEART RATE: 87 BPM | OXYGEN SATURATION: 97 % | TEMPERATURE: 98 F | HEIGHT: 65 IN | RESPIRATION RATE: 18 BRPM | BODY MASS INDEX: 30.66 KG/M2

## 2018-09-10 DIAGNOSIS — J06.9 VIRAL URI: Primary | ICD-10-CM

## 2018-09-10 NOTE — PROGRESS NOTES
Subjective:  
Presents with sore throat 3 days and nasal  congestion for 1 day. Denies shortness of breath, chest pain, abdominal pain, nausea, vomiting,  fever and chills. Symptoms are mild. Recent treatment for similar symptoms?  none. Has tried over-the-counter remedies  with mild and paritial relief of symptoms. Asthma?:  no.   reports that she has never smoked. She has never used smokeless tobacco. .  Contacts with similar infections: no She has a friend who  of a complications of strep pharyngitis is is concerned about ruling out strep infection. In addition, she had a wart removed last week and was concerned that if she had strep infection that could cause infection of her wart area. Denies any significant pain, swelling, redness at the site of the wart removal of the foot. Review of Systems Pertinent items are noted in HPI. Objective:  
 
Visit Vitals  /72 (BP 1 Location: Left arm, BP Patient Position: Sitting)  Pulse 87  Temp 98 °F (36.7 °C) (Oral)  Resp 18  Ht 5' 5\" (1.651 m)  Wt 184 lb (83.5 kg)  SpO2 97%  BMI 30.62 kg/m2 General:  alert, cooperative, no distress Eyes: conjunctivae/corneas clear. Ears: normal TM's and external ear canals AU Sinuses: Normal paranasal sinuses without tenderness, indurated and edematous nasal tubinates Mouth:  normal findings: palate normal, tongue midline and normal and oropharynx pink & moist with erythema, but no exudates, ulcers or evidence of thrush Neck: supple, symmetrical, trachea midline and no adenopathy. Heart: S1 and S2 normal, no murmurs noted. Lungs: clear to auscultation bilaterally Abdomen: soft, non-tender. Bowel sounds normal. No masses,  no organomegaly Assessment/Plan:  
Upper respiratory infection Exam is benign. No evidence of strep on exam and rapid strep test is normal. 
Reassured. Wart on foot appears to be healing well.   She has follow-up with podiatry this week. Discussed dx and tx of URIs Discussed the importance of avoiding unnecessary abx therapy. - Seek medical care if symptoms become more severe or if you develop chest pain, shortness of breath, confusion. Contact us if your symptoms fail to improve after 7-10 days Rest as much as possible and stay home from work/school at least 24 hours  
after last fever. Wash hand frequently and cough/sneeze into your sleeve to help prevent infection of others. Drink plenty of fluids - Ibuprofen (Advil, Motrin) 400-800mg every 6 hours or Aleve 220 mg 1-2 pills every 8 hours for fever, headache, pain - Tylenol extra strength 500 mg every 6 hours for pain, headache, fever 
- Nasal saline rinses 2-3 times daily for nasal congestion - Mucinex 1200 mg twice daily or Guaifenesin 400 mg every 4 hours for chest  
    congestion - Robitussin DM or Delsym for cough - Cepacol throat losenges and saline gargles (1 tsp salt in 8 oz water) for sore  
    throat - Tea with honey for cough - Benadryl (diphenhydramine) 50 mg at night for nasal congestion/allergies - Pseudophedrine 12-hour tablets twice daily for nasal and inner ear congestion.   
- Afrin (oxymetazoline) nasal spray 2 sprays in each nostril twice daily for severe  
   congestion. Do not use this medication for more than 3 days as it may cause \"rebound congestion\".

## 2018-10-11 DIAGNOSIS — G89.29 CHRONIC PAIN OF LEFT ANKLE: ICD-10-CM

## 2018-10-11 DIAGNOSIS — G89.29 CHRONIC PAIN OF LEFT KNEE: ICD-10-CM

## 2018-10-11 DIAGNOSIS — M25.572 CHRONIC PAIN OF LEFT ANKLE: ICD-10-CM

## 2018-10-11 DIAGNOSIS — M25.562 CHRONIC PAIN OF LEFT KNEE: ICD-10-CM

## 2018-12-11 DIAGNOSIS — F41.9 ANXIETY: ICD-10-CM

## 2018-12-11 RX ORDER — ALPRAZOLAM 0.25 MG/1
TABLET ORAL
Qty: 60 TAB | Refills: 0 | OUTPATIENT
Start: 2018-12-11 | End: 2019-03-16 | Stop reason: SDUPTHER

## 2019-01-03 RX ORDER — ATORVASTATIN CALCIUM 10 MG/1
TABLET, FILM COATED ORAL
Qty: 90 TAB | Refills: 0 | Status: SHIPPED | OUTPATIENT
Start: 2019-01-03 | End: 2019-04-13 | Stop reason: SDUPTHER

## 2019-03-16 DIAGNOSIS — F41.9 ANXIETY: ICD-10-CM

## 2019-03-17 RX ORDER — ALPRAZOLAM 0.25 MG/1
TABLET ORAL
Qty: 60 TAB | Refills: 0 | OUTPATIENT
Start: 2019-03-17 | End: 2019-06-08 | Stop reason: SDUPTHER

## 2019-03-17 RX ORDER — OLMESARTAN MEDOXOMIL 20 MG/1
TABLET ORAL
Qty: 90 TAB | Refills: 0 | Status: SHIPPED | OUTPATIENT
Start: 2019-03-17 | End: 2019-06-14 | Stop reason: SDUPTHER

## 2019-03-18 ENCOUNTER — TELEPHONE (OUTPATIENT)
Dept: INTERNAL MEDICINE CLINIC | Age: 74
End: 2019-03-18

## 2019-03-18 NOTE — TELEPHONE ENCOUNTER
Patient called to report that she has a sore at the bottom of her spine. Patient stated that she would like to see DR. Medina Courser this week. Patient was offered appointment today at 10:30 but declined stating she was at another doctor. Patient would like a call from the nurse regarding. Patient stated if unable to answer please leave a message as she is at another doctor.      [phone]

## 2019-03-18 NOTE — TELEPHONE ENCOUNTER
Contacted pt and provided appt. Pt has small wound at divot at base of spine she believe it developed just due to not being dry all the time. She is wondering if she should go to urgent care. Advised she was welcome to go if she did not want to wait unitl offered appt on Wed but can treat with antibacterial ointment and a cushion bandage if she would like as this is the typically treatment unless infection is involved. She will do that then cancel treatment if area is improving.

## 2019-03-20 ENCOUNTER — OFFICE VISIT (OUTPATIENT)
Dept: INTERNAL MEDICINE CLINIC | Age: 74
End: 2019-03-20

## 2019-03-20 VITALS
OXYGEN SATURATION: 98 % | RESPIRATION RATE: 16 BRPM | WEIGHT: 190.4 LBS | HEIGHT: 65 IN | HEART RATE: 76 BPM | BODY MASS INDEX: 31.72 KG/M2 | TEMPERATURE: 97.5 F | DIASTOLIC BLOOD PRESSURE: 76 MMHG | SYSTOLIC BLOOD PRESSURE: 129 MMHG

## 2019-03-20 DIAGNOSIS — R05.9 COUGH: ICD-10-CM

## 2019-03-20 DIAGNOSIS — K21.9 GASTROESOPHAGEAL REFLUX DISEASE WITHOUT ESOPHAGITIS: ICD-10-CM

## 2019-03-20 DIAGNOSIS — I10 ESSENTIAL HYPERTENSION: Primary | ICD-10-CM

## 2019-03-20 DIAGNOSIS — E78.5 DYSLIPIDEMIA: ICD-10-CM

## 2019-03-20 DIAGNOSIS — R73.03 PREDIABETES: ICD-10-CM

## 2019-03-20 DIAGNOSIS — F41.9 ANXIETY: ICD-10-CM

## 2019-03-20 DIAGNOSIS — R21 RASH: ICD-10-CM

## 2019-03-20 NOTE — PROGRESS NOTES
HISTORY OF PRESENT ILLNESS Matthew Perez is a 75 y.o. female. HPI Pt followed up for MRI which detected meniscus tear. She followed up with orthopedics for injection (effective). Rash: Pt c/o painful, erythematous, non-itchy rash near gluteal cleft for past 5 days. She has been applying Bacitracin. She suspects that rash could be related to hot shower. Denies having sat for long period of time. Hypertension ROS: taking medications as instructed, no medication side effects noted, no TIA's, no chest pain on exertion, no dyspnea on exertion, no swelling of ankles. New concerns:  Patient's BP in office today is 129/76. Dyslipidemia: 
Cardiovascular risk analysis - 75 y.o. female LDL goal is under 130. ROS: taking medications as instructed, no medication side effects noted, no TIA's, no chest pain on exertion, no dyspnea on exertion, no swelling of ankles. Tolerating meds, no myalgias or other side effects noted New concerns: Her last LDL was 46 on 5/11/18. Pt continues on Lipitor. Prediabetes: Stable. Pt hasn't been exercising regularly but plans to go back to gym soon. Mood: Stable. Pt rarely takes Xanax. GERD: Stable, and well-managed with Nexium. Cough: Pt c/o cough. She followed up with pulmonologist who provided allergy meds (ineffective). She then followed up with cardiologist who couldn't pinpoint explanation. Pt followed up with vascular specialist who recommended PT for subclavian steal syndrome. Pt followed up with dermatologist for facial rash who recommended 2-week cycles of steroids with 1-week break. Review of Systems All other systems reviewed and are negative. Physical Exam  
Constitutional: She is oriented to person, place, and time. She appears well-developed and well-nourished. HENT:  
Head: Normocephalic and atraumatic.   
Right Ear: External ear normal.  
Left Ear: External ear normal.  
Nose: Nose normal.  
 Mouth/Throat: Oropharynx is clear and moist.  
Eyes: Conjunctivae and EOM are normal.  
Neck: Normal range of motion. Neck supple. Carotid bruit is not present. No thyroid mass and no thyromegaly present. Cardiovascular: Normal rate, regular rhythm, S1 normal, S2 normal, normal heart sounds and intact distal pulses. Pulmonary/Chest: Effort normal and breath sounds normal.  
Abdominal: Soft. Normal appearance and bowel sounds are normal. There is no hepatosplenomegaly. There is no tenderness. Musculoskeletal: Normal range of motion. Neurological: She is alert and oriented to person, place, and time. She has normal strength. No cranial nerve deficit or sensory deficit. Coordination normal.  
Skin: Skin is warm, dry and intact. No abrasion and no rash noted. Mild erythema near gluteal cleft. No abscess, warmth, or discharge. Psychiatric: She has a normal mood and affect. Her behavior is normal. Judgment and thought content normal.  
Nursing note and vitals reviewed. ASSESSMENT and PLAN Diagnoses and all orders for this visit: 1. Essential hypertension BP is at goal. I do not recommend any change in medications. -     CBC W/O DIFF 2. Dyslipidemia Stable, patient tolerating meds, no myalgias. I do not recommend any change in medications. 
-     LIPID PANEL 
-     METABOLIC PANEL, COMPREHENSIVE 3. Prediabetes Stable condition. Will monitor for any changes or improvements. 
-     HEMOGLOBIN A1C WITH EAG 4. Anxiety Stable, and doing well. Continue current medications. 5. Gastroesophageal reflux disease without esophagitis Stable, and well-managed. No change in medications. 6. Rash Stable condition. Advised pt to continue applying Bacitracin regularly. 7. Cough Stable condition. Will monitor for any changes or improvements. Lab results and schedule of future lab studies reviewed with patient. Reviewed diet, exercise and weight control. Written by Benton Rothman, as dictated by Sergio Mayorga MD.  
 
Current diagnosis and concerns discussed with pt at length. Understands risks and benefits or current treatment plan and medications and accepts the treatment and medication with any possible risks. Pt asks appropriate questions which were answered. Pt instructed to call with any concerns or problems. This note will not be viewable in [address]

## 2019-03-28 ENCOUNTER — HOSPITAL ENCOUNTER (OUTPATIENT)
Dept: LAB | Age: 74
Discharge: HOME | End: 2019-03-28
Payer: MEDICARE

## 2019-03-28 PROCEDURE — 85027 COMPLETE CBC AUTOMATED: CPT

## 2019-03-28 PROCEDURE — 80061 LIPID PANEL: CPT

## 2019-03-28 PROCEDURE — 83036 HEMOGLOBIN GLYCOSYLATED A1C: CPT

## 2019-03-28 PROCEDURE — 80053 COMPREHEN METABOLIC PANEL: CPT

## 2019-03-28 PROCEDURE — 36415 COLL VENOUS BLD VENIPUNCTURE: CPT

## 2019-03-29 LAB
ALBUMIN SERPL-MCNC: 4.5 G/DL (ref 3.5–4.8)
ALBUMIN/GLOB SERPL: 1.6 {RATIO} (ref 1.2–2.2)
ALP SERPL-CCNC: 96 IU/L (ref 39–117)
ALT SERPL-CCNC: 18 IU/L (ref 0–32)
AST SERPL-CCNC: 20 IU/L (ref 0–40)
BILIRUB SERPL-MCNC: 0.5 MG/DL (ref 0–1.2)
BUN SERPL-MCNC: 16 MG/DL (ref 8–27)
BUN/CREAT SERPL: 18 (ref 12–28)
CALCIUM SERPL-MCNC: 9.7 MG/DL (ref 8.7–10.3)
CHLORIDE SERPL-SCNC: 98 MMOL/L (ref 96–106)
CHOLEST SERPL-MCNC: 148 MG/DL (ref 100–199)
CO2 SERPL-SCNC: 26 MMOL/L (ref 20–29)
CREAT SERPL-MCNC: 0.91 MG/DL (ref 0.57–1)
ERYTHROCYTE [DISTWIDTH] IN BLOOD BY AUTOMATED COUNT: 14.4 % (ref 12.3–15.4)
EST. AVERAGE GLUCOSE BLD GHB EST-MCNC: 123 MG/DL
GLOBULIN SER CALC-MCNC: 2.9 G/DL (ref 1.5–4.5)
GLUCOSE SERPL-MCNC: 97 MG/DL (ref 65–99)
HBA1C MFR BLD: 5.9 % (ref 4.8–5.6)
HCT VFR BLD AUTO: 38.4 % (ref 34–46.6)
HDLC SERPL-MCNC: 68 MG/DL
HGB BLD-MCNC: 13.3 G/DL (ref 11.1–15.9)
INTERPRETATION, 910389: NORMAL
LDLC SERPL CALC-MCNC: 59 MG/DL (ref 0–99)
MCH RBC QN AUTO: 31.6 PG (ref 26.6–33)
MCHC RBC AUTO-ENTMCNC: 34.6 G/DL (ref 31.5–35.7)
MCV RBC AUTO: 91 FL (ref 79–97)
PLATELET # BLD AUTO: 220 X10E3/UL (ref 150–379)
POTASSIUM SERPL-SCNC: 4.6 MMOL/L (ref 3.5–5.2)
PROT SERPL-MCNC: 7.4 G/DL (ref 6–8.5)
RBC # BLD AUTO: 4.21 X10E6/UL (ref 3.77–5.28)
SODIUM SERPL-SCNC: 138 MMOL/L (ref 134–144)
TRIGL SERPL-MCNC: 103 MG/DL (ref 0–149)
VLDLC SERPL CALC-MCNC: 21 MG/DL (ref 5–40)
WBC # BLD AUTO: 8.6 X10E3/UL (ref 3.4–10.8)

## 2019-04-14 RX ORDER — ATORVASTATIN CALCIUM 10 MG/1
TABLET, FILM COATED ORAL
Qty: 90 TAB | Refills: 0 | Status: SHIPPED | OUTPATIENT
Start: 2019-04-14 | End: 2019-07-15 | Stop reason: SDUPTHER

## 2019-06-08 DIAGNOSIS — F41.9 ANXIETY: ICD-10-CM

## 2019-06-08 RX ORDER — ALPRAZOLAM 0.25 MG/1
TABLET ORAL
Qty: 60 TAB | Refills: 0 | OUTPATIENT
Start: 2019-06-08 | End: 2019-10-07 | Stop reason: SDUPTHER

## 2019-06-15 RX ORDER — OLMESARTAN MEDOXOMIL 20 MG/1
TABLET ORAL
Qty: 90 TAB | Refills: 0 | Status: SHIPPED | OUTPATIENT
Start: 2019-06-15 | End: 2019-09-22 | Stop reason: SDUPTHER

## 2019-07-12 ENCOUNTER — TELEPHONE (OUTPATIENT)
Dept: INTERNAL MEDICINE CLINIC | Age: 74
End: 2019-07-12

## 2019-07-12 NOTE — TELEPHONE ENCOUNTER
Kadi Morales, [address] [place] [address] Front Office [place]         Corinne Mau General Message/Vendor Calls     Caller's first and last name: Raquel Maryellen       Reason for call: Requesting a call back to reset her log in       Call back required yes/no and why: Yes       Best contact number(s): [phone] or through e-mail       Details to clarify the request:       Norma Crespo

## 2019-07-15 RX ORDER — ATORVASTATIN CALCIUM 10 MG/1
TABLET, FILM COATED ORAL
Qty: 90 TAB | Refills: 0 | Status: SHIPPED | OUTPATIENT
Start: 2019-07-15 | End: 2019-11-26 | Stop reason: SDUPTHER

## 2019-09-27 RX ORDER — OLMESARTAN MEDOXOMIL 20 MG/1
TABLET ORAL
Qty: 90 TAB | Refills: 0 | Status: SHIPPED | OUTPATIENT
Start: 2019-09-27 | End: 2019-11-12 | Stop reason: SDUPTHER

## 2019-10-07 DIAGNOSIS — F41.9 ANXIETY: ICD-10-CM

## 2019-10-07 RX ORDER — ALPRAZOLAM 0.25 MG/1
TABLET ORAL
Qty: 60 TAB | Refills: 2 | OUTPATIENT
Start: 2019-10-07 | End: 2020-06-23 | Stop reason: SDUPTHER

## 2019-11-12 ENCOUNTER — HOSPITAL ENCOUNTER (OUTPATIENT)
Dept: LAB | Age: 74
Discharge: HOME | End: 2019-11-12
Payer: MEDICARE

## 2019-11-12 ENCOUNTER — OFFICE VISIT (OUTPATIENT)
Dept: INTERNAL MEDICINE CLINIC | Age: 74
End: 2019-11-12

## 2019-11-12 VITALS
OXYGEN SATURATION: 93 % | DIASTOLIC BLOOD PRESSURE: 67 MMHG | SYSTOLIC BLOOD PRESSURE: 121 MMHG | HEART RATE: 66 BPM | TEMPERATURE: 97.3 F | BODY MASS INDEX: 31.52 KG/M2 | HEIGHT: 65 IN | RESPIRATION RATE: 14 BRPM | WEIGHT: 189.2 LBS

## 2019-11-12 DIAGNOSIS — L65.9 HAIR LOSS: ICD-10-CM

## 2019-11-12 DIAGNOSIS — M25.511 CHRONIC RIGHT SHOULDER PAIN: ICD-10-CM

## 2019-11-12 DIAGNOSIS — G89.29 CHRONIC RIGHT SHOULDER PAIN: ICD-10-CM

## 2019-11-12 DIAGNOSIS — Z00.00 MEDICARE ANNUAL WELLNESS VISIT, SUBSEQUENT: Primary | ICD-10-CM

## 2019-11-12 DIAGNOSIS — R73.03 PREDIABETES: ICD-10-CM

## 2019-11-12 DIAGNOSIS — I10 ESSENTIAL HYPERTENSION: ICD-10-CM

## 2019-11-12 DIAGNOSIS — E78.5 DYSLIPIDEMIA: ICD-10-CM

## 2019-11-12 DIAGNOSIS — F41.9 ANXIETY: ICD-10-CM

## 2019-11-12 DIAGNOSIS — E55.9 VITAMIN D DEFICIENCY: ICD-10-CM

## 2019-11-12 DIAGNOSIS — K21.9 GASTROESOPHAGEAL REFLUX DISEASE WITHOUT ESOPHAGITIS: ICD-10-CM

## 2019-11-12 PROCEDURE — 36415 COLL VENOUS BLD VENIPUNCTURE: CPT

## 2019-11-12 PROCEDURE — 80053 COMPREHEN METABOLIC PANEL: CPT

## 2019-11-12 PROCEDURE — 84443 ASSAY THYROID STIM HORMONE: CPT

## 2019-11-12 PROCEDURE — 80061 LIPID PANEL: CPT

## 2019-11-12 PROCEDURE — 85027 COMPLETE CBC AUTOMATED: CPT

## 2019-11-12 PROCEDURE — 82306 VITAMIN D 25 HYDROXY: CPT

## 2019-11-12 PROCEDURE — 83036 HEMOGLOBIN GLYCOSYLATED A1C: CPT

## 2019-11-12 NOTE — PROGRESS NOTES
HISTORY OF PRESENT ILLNESS  Damino Andrewr is a 75 y.o. female. HPI  Hypertension ROS: taking medications as instructed, no medication side effects noted, no TIA's, no chest pain on exertion, no dyspnea on exertion, no swelling of ankles. New concerns: BP in office today is 121/67. Hyperlipidemia:  Cardiovascular risk analysis - 75 y.o. female LDL goal is under 130. ROS: taking medications as instructed, no medication side effects noted, no TIA's, no chest pain on exertion, no dyspnea on exertion, no swelling of ankles. Tolerating meds, no myalgias or other side effects noted  New concerns: Pt's last LDL was 59 on 3/29/19. Barrette's: Pt reports that she was dx with Barrette's recently with an endoscopy. Inquires about difference between Nexium and Protonix. She notes side effects of Nexium (metal taste). She notes she DC her multivitamin that increased her metallic taste in mouth. Notes persisting cough. Eye: Pt continues to optometrist (Dr. Graciela Ferrer) every 6 mo. She is on Valtrex daily for her eye issues. She is getting a corneal transplant soon. Shoulder: Pt was informed that she would need a shoulder transplant- she is trying other routes. Her surgeon did not want to put her under for the transplant. Hair loss: Pt reports that she continues with hair loss. She saw her dermatologist advised her to take vitamins and Rogaine. She is afraid of vitamins. Confirms stress trigger as well. Prediabetes: Denies exercise due to being the primary care taker of her . He had a back surgery (Dr. Hilton Santo) this year and has not done well. He is continuing to f.u with Dr. Hilton Santo to discuss other surgical routes. Anxiety: Stable, pt continue on Xanax very PRN. Review of Systems   Skin:        Continued hair loss   All other systems reviewed and are negative. Physical Exam   Constitutional: She is oriented to person, place, and time. She appears well-developed and well-nourished. HENT:   Head: Normocephalic and atraumatic. Right Ear: External ear normal.   Left Ear: External ear normal.   Nose: Nose normal.   Mouth/Throat: Oropharynx is clear and moist.   Eyes: Pupils are equal, round, and reactive to light. Conjunctivae and EOM are normal.   Neck: Normal range of motion. Neck supple. Cardiovascular: Normal rate, regular rhythm, normal heart sounds and intact distal pulses. Pulmonary/Chest: Effort normal and breath sounds normal. Right breast exhibits no inverted nipple, no mass, no nipple discharge, no skin change and no tenderness. Left breast exhibits no inverted nipple, no mass, no nipple discharge, no skin change and no tenderness. No breast swelling, tenderness, discharge or bleeding. Breasts are symmetrical.   Abdominal: Soft. Bowel sounds are normal.   Genitourinary: Rectum normal and vagina normal. Rectal exam shows anal tone normal and guaiac negative stool. No breast swelling, tenderness, discharge or bleeding. Musculoskeletal: Normal range of motion. Neurological: She is alert and oriented to person, place, and time. Skin: Skin is warm and dry. Psychiatric: She has a normal mood and affect. Her behavior is normal. Judgment and thought content normal.   Nursing note and vitals reviewed. ASSESSMENT and PLAN  Diagnoses and all orders for this visit:    1. Medicare annual wellness visit, subsequent    2. Anxiety  Stable and well-managed with Xanax PRN. No change in medications. 3. Essential hypertension  BP is at goal. I do not recommend any change in medications. -     CBC W/O DIFF; Future    4. Dyslipidemia  Stable, patient is tolerating medications, no myalgias. I do not recommend any change in medications.   -     LIPID PANEL; Future  -     METABOLIC PANEL, COMPREHENSIVE; Future    5. Prediabetes  Sugars stable. Continue to follow diabetic diet and monitor sugars.  Encouraged pt to exercise even though she is a primary caretaker.    -     HEMOGLOBIN A1C WITH EAG; Future    6. Gastroesophageal reflux disease without esophagitis  Stable and well-managed with Nexium 2x daily. No change in medications. Pt will continue to f/u with specialist. Discussed with pt that the multivitamin could give a metallic taste if increased copper or a weird coating on the tablet. Informed pt that Nexium and Protonix are basically the same medication. 7. Chronic right shoulder pain  Stable. Pt continues to f/u with specialist to determine long-term treatment other than surgery. Will continue to monitor for improvements or changes. 8. Hair loss  Ordered labs to r/o possible underlying causes of hair loss. Will continue to monitor for improvements or changes. Waiting on results. Informed pt that she should be able to take Biotin for hair, skin and nails.   -     TSH 3RD GENERATION; Future  -     VITAMIN D, 25 HYDROXY; Future    9. Vitamin D deficiency  Ordered general health labs. Waiting on results. -     VITAMIN D, 25 HYDROXY; Future    Additional comments: Discussed the Shingrex vaccine with pt. Informed pt that this new vaccine is replacing the old one with a 90% protective factor. Explained to pt that the 2-part vaccine can cause a flu-like illness for 24-48 hours. Discussed with pt that she is due for bone density in 2020. Lab results and schedule of future lab studies reviewed with patient. Reviewed diet, exercise and weight control. Written by Karina Humphrey, as dictated by Eli De Dios MD.     Current diagnosis and concerns discussed with pt at length. Understands risks and benefits or current treatment plan and medications and accepts the treatment and medication with any possible risks. Pt asks appropriate questions which were answered. Pt instructed to call with any concerns or problems. This note will not be viewable in [address]

## 2019-11-12 NOTE — PROGRESS NOTES
This is the Subsequent Medicare Annual Wellness Exam, performed 12 months or more after the Initial AWV or the last Subsequent AWV    I have reviewed the patient's medical history in detail and updated the computerized patient record. History     Patient Active Problem List   Diagnosis Code    Essential hypertension, benign I10    Hyperlipidemia LDL goal <130 E78.5    BCC (basal cell carcinoma of skin) C44.91    Gastroparesis K31.84    Advanced care planning/counseling discussion Z71.89     Past Medical History:   Diagnosis Date    BCC (basal cell carcinoma of skin) 6/8/2015    Constipation     Dyspepsia and other specified disorders of function of stomach     Essential hypertension, benign 1/25/2013    Gastroparesis     GERD (gastroesophageal reflux disease)     Hemorrhoids     Hiatal hernia     Hypertension     Nausea & vomiting     Other and unspecified hyperlipidemia 1/25/2013    Other and unspecified hyperlipidemia 1/25/2013      Past Surgical History:   Procedure Laterality Date    ENDOSCOPY, COLON, DIAGNOSTIC      2011 normal    HX BREAST BIOPSY  2004    LEFT    HX CHOLECYSTECTOMY  2/2014    HX CORNEAL TRANSPLANT Left 1972    HX ORTHOPAEDIC  3/08    RIGHT rotator cuff repair     Current Outpatient Medications   Medication Sig Dispense Refill    ALPRAZolam (XANAX) 0.25 mg tablet TAKE 1 TABLET BY MOUTH TWICE DAILY AS NEEDED 60 Tab 2    atorvastatin (LIPITOR) 10 mg tablet TAKE 1 TABLET BY MOUTH EVERY DAY 90 Tab 0    hydrocortisone (HYTONE) 2.5 % topical cream APPLY TO AFFECTED AREA BID FOR 5 TO 7 DAYS  1    olmesartan (BENICAR) 20 mg tablet TAKE 1 TABLET BY MOUTH EVERY DAY 90 Tab 0    ERYPED 200 200 mg/5 mL suspension   2    Emollient Combination No.43 (PROMISEB) crea by Apply Externally route.  ondansetron (ZOFRAN ODT) 4 mg disintegrating tablet Take 1 Tab by mouth every eight (8) hours as needed for Nausea.  10 Tab 0    esomeprazole (NEXIUM) 40 mg capsule Take 40 mg by mouth two (2) times a day.  BIOTIN PO Take 1,000 mcg by mouth daily.  valACYclovir (VALTREX) 500 mg tablet Take  by mouth two (2) times a day.  aspirin 81 mg tablet Take 81 mg by mouth daily.  magnesium 250 mg Tab Take 1 Tab by mouth daily.  multivitamin (ONE A DAY) tablet Take 1 Tab by mouth daily.  omega-3 fatty acids-vitamin e (FISH OIL) 1,000 mg cap Take 1 Cap by mouth daily. Allergies   Allergen Reactions    Ciprofloxacin Swelling    Other Medication Swelling     Steroid shots    Pcn [Penicillins] Rash       Family History   Problem Relation Age of Onset    Stroke Mother         Arva Catching    Heart Disease Mother     Cancer Father         NHL    Other Other         amyloid     Social History     Tobacco Use    Smoking status: Never Smoker    Smokeless tobacco: Never Used   Substance Use Topics    Alcohol use: Yes     Alcohol/week: 2.5 standard drinks     Types: 3 Glasses of wine per week       Depression Risk Factor Screening:     3 most recent PHQ Screens 11/12/2019   Little interest or pleasure in doing things Not at all   Feeling down, depressed, irritable, or hopeless Not at all   Total Score PHQ 2 0       Alcohol Risk Factor Screening:   Do you average 1 drink per night or more than 7 drinks a week:  No    On any one occasion in the past three months have you have had more than 3 drinks containing alcohol:  No      Functional Ability and Level of Safety:   Hearing: Hearing is good. Activities of Daily Living: The home contains: no safety equipment. Patient does total self care    Ambulation: with no difficulty    Fall Risk:  Fall Risk Assessment, last 12 mths 11/12/2019   Able to walk? -   Fall in past 12 months?  No       Abuse Screen:  Patient is not abused    Cognitive Screening   Has your family/caregiver stated any concerns about your memory: no  Cognitive Screening: Normal - MMSE (Mini Mental Status Exam)    Patient Care Team   Patient Care Team:  Pedro Back MD as PCP - General (Internal Medicine)  Pedro Back MD as PCP - [facility] Empaneled Provider  Derek Reyes MD (Gynecology)  Lisha Alvarado MD (Ophthalmology)  Cornelio Gates MD (Orthopedic Surgery)    Assessment/Plan   Education and counseling provided:  Are appropriate based on today's review and evaluation  End-of-Life planning (with patient's consent)    Diagnoses and all orders for this visit:    1. Anxiety    2. Essential hypertension    3. Dyslipidemia    4. Prediabetes    5. Gastroesophageal reflux disease without esophagitis        Health Maintenance Due   Topic Date Due    MEDICARE YEARLY EXAM  05/12/2019     This note will not be viewable in MyChart.

## 2019-11-12 NOTE — PATIENT INSTRUCTIONS
Medicare Wellness Visit, Female The best way to live healthy is to have a lifestyle where you eat a well-balanced diet, exercise regularly, limit alcohol use, and quit all forms of tobacco/nicotine, if applicable. Regular preventive services are another way to keep healthy. Preventive services (vaccines, screening tests, monitoring & exams) can help personalize your care plan, which helps you manage your own care. Screening tests can find health problems at the earliest stages, when they are easiest to treat. [facility] follows the current, evidence-based guidelines published by the [facility] ([facility]) when recommending preventive services for our patients. Because we follow these guidelines, sometimes recommendations change over time as research supports it. (For example, mammograms used to be recommended annually. Even though Medicare will still pay for an annual mammogram, the newer guidelines recommend a mammogram every two years for women of average risk). Of course, you and your doctor may decide to screen more often for some diseases, based on your risk and your co-morbidities (chronic disease you are already diagnosed with). Preventive services for you include: - Medicare offers their members a free annual wellness visit, which is time for you and your primary care provider to discuss and plan for your preventive service needs. Take advantage of this benefit every year! 
-All adults over the age of 72 should receive the recommended pneumonia vaccines. Current USPSTF guidelines recommend a series of two vaccines for the best pneumonia protection.  
-All adults should have a flu vaccine yearly and a tetanus vaccine every 10 years.  
-All adults age 48 and older should receive the shingles vaccines (series of two vaccines). -All adults age 38-68 who are overweight should have a diabetes screening test once every three years. -All adults born between [birth year] and 1966 should be screened once for Hepatitis C. 
-Other screening tests and preventive services for persons with diabetes include: an eye exam to screen for diabetic retinopathy, a kidney function test, a foot exam, and stricter control over your cholesterol.  
-Cardiovascular screening for adults with routine risk involves an electrocardiogram (ECG) at intervals determined by your doctor.  
-Colorectal cancer screenings should be done for adults age 54-65 with no increased risk factors for colorectal cancer. There are a number of acceptable methods of screening for this type of cancer. Each test has its own benefits and drawbacks. Discuss with your doctor what is most appropriate for you during your annual wellness visit. The different tests include: colonoscopy (considered the best screening method), a fecal occult blood test, a fecal DNA test, and sigmoidoscopy. 
 
-A bone mass density test is recommended when a woman turns 65 to screen for osteoporosis. This test is only recommended one time, as a screening. Some providers will use this same test as a disease monitoring tool if you already have osteoporosis. -Breast cancer screenings are recommended every other year for women of normal risk, age 54-69. 
-Cervical cancer screenings for women over age 72 are only recommended with certain risk factors. Here is a list of your current Health Maintenance items (your personalized list of preventive services) with a due date: 
Health Maintenance Due Topic Date Due  
 Annual Well Visit  05/12/2019

## 2019-11-13 LAB
25(OH)D3+25(OH)D2 SERPL-MCNC: 26.2 NG/ML (ref 30–100)
ALBUMIN SERPL-MCNC: 4.6 G/DL (ref 3.5–4.8)
ALBUMIN/GLOB SERPL: 1.7 {RATIO} (ref 1.2–2.2)
ALP SERPL-CCNC: 106 IU/L (ref 39–117)
ALT SERPL-CCNC: 17 IU/L (ref 0–32)
AST SERPL-CCNC: 19 IU/L (ref 0–40)
BILIRUB SERPL-MCNC: 0.4 MG/DL (ref 0–1.2)
BUN SERPL-MCNC: 18 MG/DL (ref 8–27)
BUN/CREAT SERPL: 18 (ref 12–28)
CALCIUM SERPL-MCNC: 9.8 MG/DL (ref 8.7–10.3)
CHLORIDE SERPL-SCNC: 99 MMOL/L (ref 96–106)
CHOLEST SERPL-MCNC: 159 MG/DL (ref 100–199)
CO2 SERPL-SCNC: 24 MMOL/L (ref 20–29)
CREAT SERPL-MCNC: 0.99 MG/DL (ref 0.57–1)
ERYTHROCYTE [DISTWIDTH] IN BLOOD BY AUTOMATED COUNT: 12.6 % (ref 12.3–15.4)
EST. AVERAGE GLUCOSE BLD GHB EST-MCNC: 117 MG/DL
GLOBULIN SER CALC-MCNC: 2.7 G/DL (ref 1.5–4.5)
GLUCOSE SERPL-MCNC: 96 MG/DL (ref 65–99)
HBA1C MFR BLD: 5.7 % (ref 4.8–5.6)
HCT VFR BLD AUTO: 41.3 % (ref 34–46.6)
HDLC SERPL-MCNC: 70 MG/DL
HGB BLD-MCNC: 13.8 G/DL (ref 11.1–15.9)
INTERPRETATION, 910389: NORMAL
INTERPRETATION: NORMAL
LDLC SERPL CALC-MCNC: 72 MG/DL (ref 0–99)
MCH RBC QN AUTO: 30.9 PG (ref 26.6–33)
MCHC RBC AUTO-ENTMCNC: 33.4 G/DL (ref 31.5–35.7)
MCV RBC AUTO: 93 FL (ref 79–97)
PDF IMAGE, 910387: NORMAL
PLATELET # BLD AUTO: 278 X10E3/UL (ref 150–450)
POTASSIUM SERPL-SCNC: 4.9 MMOL/L (ref 3.5–5.2)
PROT SERPL-MCNC: 7.3 G/DL (ref 6–8.5)
RBC # BLD AUTO: 4.46 X10E6/UL (ref 3.77–5.28)
SODIUM SERPL-SCNC: 139 MMOL/L (ref 134–144)
TRIGL SERPL-MCNC: 84 MG/DL (ref 0–149)
TSH SERPL DL<=0.005 MIU/L-ACNC: 0.91 UIU/ML (ref 0.45–4.5)
VLDLC SERPL CALC-MCNC: 17 MG/DL (ref 5–40)
WBC # BLD AUTO: 10.4 X10E3/UL (ref 3.4–10.8)

## 2019-11-26 RX ORDER — ATORVASTATIN CALCIUM 10 MG/1
TABLET, FILM COATED ORAL
Qty: 90 TAB | Refills: 0 | Status: SHIPPED | OUTPATIENT
Start: 2019-11-26 | End: 2020-03-19

## 2020-01-13 RX ORDER — OLMESARTAN MEDOXOMIL 20 MG/1
TABLET ORAL
Qty: 90 TAB | Refills: 0 | Status: SHIPPED | OUTPATIENT
Start: 2020-01-13 | End: 2020-04-22 | Stop reason: SDUPTHER

## 2020-03-19 RX ORDER — ATORVASTATIN CALCIUM 10 MG/1
TABLET, FILM COATED ORAL
Qty: 90 TAB | Refills: 0 | Status: SHIPPED | OUTPATIENT
Start: 2020-03-19 | End: 2020-06-25

## 2020-03-24 RX ORDER — HYDROCORTISONE 25 MG/G
CREAM TOPICAL 2 TIMES DAILY
Qty: 30 G | Refills: 1 | Status: SHIPPED | OUTPATIENT
Start: 2020-03-24 | End: 2020-06-25

## 2020-04-21 RX ORDER — OLMESARTAN MEDOXOMIL 20 MG/1
TABLET ORAL
Qty: 90 TAB | Refills: 0 | OUTPATIENT
Start: 2020-04-21

## 2020-04-22 RX ORDER — OLMESARTAN MEDOXOMIL 20 MG/1
TABLET ORAL
Qty: 90 TAB | Refills: 0 | Status: SHIPPED | OUTPATIENT
Start: 2020-04-22 | End: 2020-07-30

## 2020-05-15 ENCOUNTER — OFFICE VISIT (OUTPATIENT)
Dept: INTERNAL MEDICINE CLINIC | Age: 75
End: 2020-05-15

## 2020-05-15 VITALS
BODY MASS INDEX: 32.59 KG/M2 | HEIGHT: 65 IN | RESPIRATION RATE: 16 BRPM | DIASTOLIC BLOOD PRESSURE: 67 MMHG | OXYGEN SATURATION: 97 % | TEMPERATURE: 98.2 F | HEART RATE: 78 BPM | WEIGHT: 195.6 LBS | SYSTOLIC BLOOD PRESSURE: 120 MMHG

## 2020-05-15 DIAGNOSIS — E78.5 HYPERLIPIDEMIA LDL GOAL <130: ICD-10-CM

## 2020-05-15 DIAGNOSIS — K31.84 GASTROPARESIS: ICD-10-CM

## 2020-05-15 DIAGNOSIS — R73.03 PREDIABETES: ICD-10-CM

## 2020-05-15 DIAGNOSIS — I10 ESSENTIAL HYPERTENSION, BENIGN: Primary | ICD-10-CM

## 2020-05-15 DIAGNOSIS — F41.9 ANXIETY: ICD-10-CM

## 2020-05-15 DIAGNOSIS — I10 ESSENTIAL HYPERTENSION, BENIGN: ICD-10-CM

## 2020-05-15 DIAGNOSIS — R05.9 COUGH: ICD-10-CM

## 2020-05-15 PROBLEM — J02.9 SORE THROAT: Status: ACTIVE | Noted: 2020-05-15

## 2020-05-15 RX ORDER — PHENAZOPYRIDINE HYDROCHLORIDE 100 MG/1
TABLET, FILM COATED ORAL
COMMUNITY
End: 2020-08-26

## 2020-05-15 RX ORDER — NYSTATIN AND TRIAMCINOLONE ACETONIDE 100000; 1 [USP'U]/G; MG/G
CREAM TOPICAL
COMMUNITY
End: 2020-08-26

## 2020-05-15 RX ORDER — OFLOXACIN 3 MG/ML
SOLUTION/ DROPS OPHTHALMIC
COMMUNITY
Start: 2020-03-25 | End: 2020-08-26

## 2020-05-15 RX ORDER — CETIRIZINE HCL 10 MG
TABLET ORAL
COMMUNITY
Start: 2020-03-19 | End: 2020-06-25

## 2020-05-15 RX ORDER — TRIAMCINOLONE ACETONIDE 0.25 MG/G
CREAM TOPICAL
COMMUNITY
End: 2020-08-26

## 2020-05-15 RX ORDER — KETOCONAZOLE 20 MG/G
CREAM TOPICAL AS NEEDED
COMMUNITY
Start: 2018-11-21 | End: 2020-06-25

## 2020-05-15 NOTE — PROGRESS NOTES
HISTORY OF PRESENT ILLNESS  Usha Batres is a 75 y.o. female. HPI  Hypertension ROS: taking medications as instructed, no medication side effects noted, no TIA's, no chest pain on exertion, no dyspnea on exertion, no swelling of ankles. New concerns: BP in office today is 120/67. Continues on olmesartan 20 mg. Hyperlipidemia:  Cardiovascular risk analysis - 75 y.o. female LDL goal is under 130. ROS: taking medications as instructed, no medication side effects noted, no TIA's, no chest pain on exertion, no dyspnea on exertion, no swelling of ankles. Tolerating meds, no myalgias or other side effects noted. New concerns: Pt's last LDL was 72 on 11/12/19. Continues on Atorvastatin 10 mg. Cough: Pt continues to have a dry tickly cough. She has another endoscopy scheduled in May. She has tried Dexilant and Nexium BID with not much improvement. She has been trying Zyrtec with Mucinex DM PRN with no relief. She recalls seeing an allergist and pulmonologist for this cough already. Pt denies sour taste in mouth. Pt denies sneezing, post nasal drip, or other allergic sx. Pt denies SOB or CP. Gastroparesis: Stable and well-managed with no noted flares. She notes that she has not taken meds for it in a while but she monitors her food intake very carefully. Anxiety: Stable, pt continues to comply with Xanax PRN. Pt reports that her  had a knee replacement recently and is healing well. They started PT last week but he continues to have nerve pain. Prediabetes: Stable and well-managed through lifestyle choices. Sciatica: Pt reports that she is having sciatica that worsens while sitting and improves while walking. Pt does not have any other acute concerns today. Review of Systems   Respiratory: Positive for cough (dry and tickly). Neurological:        Sciatica   All other systems reviewed and are negative. Physical Exam  Constitutional:       Appearance: Normal appearance. HENT:      Right Ear: Hearing, tympanic membrane and external ear normal.      Left Ear: Hearing, tympanic membrane and external ear normal.      Mouth/Throat:      Mouth: Mucous membranes are moist.      Pharynx: Oropharynx is clear. Cardiovascular:      Rate and Rhythm: Normal rate and regular rhythm. Pulmonary:      Effort: Pulmonary effort is normal.      Breath sounds: Normal breath sounds and air entry. No wheezing, rhonchi or rales. Musculoskeletal: Normal range of motion. Skin:     General: Skin is warm and dry. Neurological:      General: No focal deficit present. Mental Status: She is alert and oriented to person, place, and time. Psychiatric:         Mood and Affect: Mood normal.         Behavior: Behavior normal.         ASSESSMENT and PLAN  Diagnoses and all orders for this visit:    1. Essential hypertension, benign  BP is at goal. I do not recommend any change in medications. -     CBC W/O DIFF; Future    2. Hyperlipidemia LDL goal <130  Presumed stable, will recheck labs today. Patient is tolerating medications with no myalgias. I do not recommend any change in treatment plan. -     LIPID PANEL; Future  -     METABOLIC PANEL, COMPREHENSIVE; Future    3. Gastroparesis  Followed by GI. Stable and well-managed with food choices. Will continue to monitor for improvements or changes. 4. Anxiety  Stable and well-managed with Xanax PRN. No change in medications. 5. Prediabetes  Sugars presumed stable, will recheck today. Continue to follow diabetic diet and monitor sugars.   -     HEMOGLOBIN A1C WITH EAG; Future    6. Cough  Ordered CXR due to persistent dry tickly cough. Taney is this might be due to reflux. Pt has an endoscopy scheduled for May. Informed pt that Lisinopril causes a cough, but that is not typically seen with Benicar. However if the results of the CXR are normal, we can try switching. Will continue to monitor for improvements or changes.  She at end deferred on CXR as she had already had an eval with allergy and pulmonary and feel as if they want to see what GI says first   -     XR CHEST PA LAT; Future    Back/Sciatica: Advised pt to increase her scratching and walking. Informed pt that the more she tried to work on her alignment, the better her sciatica should get. Discussed with pt that if her sciatica does not improve then she should consider PT. Agreed with pt that she can take Ibuprofen. Will continue to monitor for improvements or changes. Lab results and schedule of future lab studies reviewed with patient. Reviewed diet, exercise and weight control. Written by Phylicia Márquez, as dictated by Nimisha Miller MD.     Current diagnosis and concerns discussed with pt at length. Understands risks and benefits or current treatment plan and medications and accepts the treatment and medication with any possible risks. Pt asks appropriate questions which were answered. Pt instructed to call with any concerns or problems.

## 2020-05-21 LAB
ALBUMIN SERPL-MCNC: 4.9 G/DL (ref 3.7–4.7)
ALBUMIN/GLOB SERPL: 1.8 {RATIO} (ref 1.2–2.2)
ALP SERPL-CCNC: 105 IU/L (ref 39–117)
ALT SERPL-CCNC: 20 IU/L (ref 0–32)
AST SERPL-CCNC: 22 IU/L (ref 0–40)
BILIRUB SERPL-MCNC: 0.6 MG/DL (ref 0–1.2)
BUN SERPL-MCNC: 17 MG/DL (ref 8–27)
BUN/CREAT SERPL: 18 (ref 12–28)
CALCIUM SERPL-MCNC: 10 MG/DL (ref 8.7–10.3)
CHLORIDE SERPL-SCNC: 96 MMOL/L (ref 96–106)
CHOLEST SERPL-MCNC: 171 MG/DL (ref 100–199)
CO2 SERPL-SCNC: 26 MMOL/L (ref 20–29)
CREAT SERPL-MCNC: 0.97 MG/DL (ref 0.57–1)
ERYTHROCYTE [DISTWIDTH] IN BLOOD BY AUTOMATED COUNT: 12.8 % (ref 11.7–15.4)
EST. AVERAGE GLUCOSE BLD GHB EST-MCNC: 126 MG/DL
GLOBULIN SER CALC-MCNC: 2.8 G/DL (ref 1.5–4.5)
GLUCOSE SERPL-MCNC: 102 MG/DL (ref 65–99)
HBA1C MFR BLD: 6 % (ref 4.8–5.6)
HCT VFR BLD AUTO: 43 % (ref 34–46.6)
HDLC SERPL-MCNC: 66 MG/DL
HGB BLD-MCNC: 14.5 G/DL (ref 11.1–15.9)
INTERPRETATION, 910389: NORMAL
INTERPRETATION: NORMAL
LDLC SERPL CALC-MCNC: 76 MG/DL (ref 0–99)
MCH RBC QN AUTO: 31.3 PG (ref 26.6–33)
MCHC RBC AUTO-ENTMCNC: 33.7 G/DL (ref 31.5–35.7)
MCV RBC AUTO: 93 FL (ref 79–97)
PDF IMAGE, 910387: NORMAL
PLATELET # BLD AUTO: 243 X10E3/UL (ref 150–450)
POTASSIUM SERPL-SCNC: 4.8 MMOL/L (ref 3.5–5.2)
PROT SERPL-MCNC: 7.7 G/DL (ref 6–8.5)
RBC # BLD AUTO: 4.64 X10E6/UL (ref 3.77–5.28)
SODIUM SERPL-SCNC: 135 MMOL/L (ref 134–144)
TRIGL SERPL-MCNC: 145 MG/DL (ref 0–149)
VLDLC SERPL CALC-MCNC: 29 MG/DL (ref 5–40)
WBC # BLD AUTO: 10.5 X10E3/UL (ref 3.4–10.8)

## 2020-06-09 RX ORDER — ROSUVASTATIN CALCIUM 5 MG/1
5 TABLET, COATED ORAL
Qty: 90 TAB | Refills: 1 | Status: SHIPPED | OUTPATIENT
Start: 2020-06-09 | End: 2021-01-19 | Stop reason: SDUPTHER

## 2020-06-22 ENCOUNTER — PATIENT MESSAGE (OUTPATIENT)
Dept: INTERNAL MEDICINE CLINIC | Age: 75
End: 2020-06-22

## 2020-06-22 NOTE — TELEPHONE ENCOUNTER
From: Devika Berman  To: Sangeeta Jennings MD  Sent: 6/22/2020 9:07 AM EDT  Subject: Visit Rainey Gowers Dr. Margurite Malling, During my last visit, I mentioned some sciatica pain and thought I could deal with it. But, lately the pain is more in the hip joint. I was wondering if the I should get an xray? Arden let me know if the I need to make and appointment or maybe a script for an xray?  Thank you, Jere Jensen

## 2020-06-23 DIAGNOSIS — F41.9 ANXIETY: ICD-10-CM

## 2020-06-24 RX ORDER — ALPRAZOLAM 0.25 MG/1
TABLET ORAL
Qty: 60 TAB | Refills: 2 | Status: SHIPPED | OUTPATIENT
Start: 2020-06-24 | End: 2020-06-30 | Stop reason: SDUPTHER

## 2020-06-25 ENCOUNTER — OFFICE VISIT (OUTPATIENT)
Dept: INTERNAL MEDICINE CLINIC | Age: 75
End: 2020-06-25

## 2020-06-25 VITALS
OXYGEN SATURATION: 98 % | HEART RATE: 85 BPM | RESPIRATION RATE: 16 BRPM | DIASTOLIC BLOOD PRESSURE: 85 MMHG | BODY MASS INDEX: 32.49 KG/M2 | HEIGHT: 65 IN | SYSTOLIC BLOOD PRESSURE: 124 MMHG | WEIGHT: 195 LBS

## 2020-06-25 DIAGNOSIS — M25.551 RIGHT HIP PAIN: ICD-10-CM

## 2020-06-25 DIAGNOSIS — M54.41 CHRONIC RIGHT-SIDED LOW BACK PAIN WITH RIGHT-SIDED SCIATICA: ICD-10-CM

## 2020-06-25 DIAGNOSIS — K21.9 GASTROESOPHAGEAL REFLUX DISEASE WITHOUT ESOPHAGITIS: ICD-10-CM

## 2020-06-25 DIAGNOSIS — G89.29 CHRONIC RIGHT-SIDED LOW BACK PAIN WITH RIGHT-SIDED SCIATICA: ICD-10-CM

## 2020-06-25 DIAGNOSIS — E78.5 HYPERLIPIDEMIA LDL GOAL <130: ICD-10-CM

## 2020-06-25 DIAGNOSIS — I10 ESSENTIAL HYPERTENSION, BENIGN: Primary | ICD-10-CM

## 2020-06-25 RX ORDER — METHYLPREDNISOLONE 4 MG/1
TABLET ORAL
Qty: 1 DOSE PACK | Refills: 0 | Status: SHIPPED | OUTPATIENT
Start: 2020-06-25 | End: 2020-08-26

## 2020-06-25 NOTE — PROGRESS NOTES
HISTORY OF PRESENT ILLNESS  Ángel Bhatt is a 75 y.o. female. HPI  Hypertension ROS: taking medications as instructed, no medication side effects noted, no TIA's, no chest pain on exertion, no dyspnea on exertion, no swelling of ankles. New concerns: BP in office today is 124/85. Continues on olmesartan. Hyperlipidemia:  Cardiovascular risk analysis - 75 y.o. female LDL goal is under 130. ROS: taking medications as instructed, no medication side effects noted, no TIA's, no chest pain on exertion, no dyspnea on exertion, no swelling of ankles. Tolerating meds, no myalgias or other side effects noted. New concerns: Pt's last LDL was 76 on 5/15/20. Continues on Crestor. Pt inquires if Crestor affects her BP. Of note, pt's cough has subsided since DC Lipitor. R hip pain: Pt reports flare of R hip and back pain radiating down to her R calf muscle. Pt complies with Ibuprofen with little improvement and continues to experience pain in her R leg, hip, and glutes. She states that she cannot bend down to put her R shoe on. Of note, pt had a reaction the last time she had a cortisone injection. Pt notes some reaction to all the steroids she has tried. Gastroparesis: Managed with strict diet. Anxiety: Stable, pt continues to comply with Xanax PRN. Prediabetes: Managed with diet and exercise. Sciatica: Stable with stretching and Ibuprofen. Pt does not have any other acute concerns today. Review of Systems   Musculoskeletal: Positive for back pain and joint pain (hip). All other systems reviewed and are negative. Physical Exam  Vitals signs and nursing note reviewed. Constitutional:       Appearance: Normal appearance. She is normal weight. HENT:      Head: Normocephalic and atraumatic.       Right Ear: Tympanic membrane, ear canal and external ear normal.      Left Ear: Tympanic membrane, ear canal and external ear normal.      Nose: Nose normal.      Mouth/Throat: Mouth: Mucous membranes are dry. Pharynx: Oropharynx is clear. Neck:      Musculoskeletal: Normal range of motion and neck supple. Cardiovascular:      Rate and Rhythm: Normal rate and regular rhythm. Pulses: Normal pulses. Heart sounds: Normal heart sounds. Pulmonary:      Effort: Pulmonary effort is normal.      Breath sounds: Normal breath sounds. Abdominal:      General: Abdomen is flat. Palpations: Abdomen is soft. Musculoskeletal:      Comments: R hip and leg pain    Skin:     General: Skin is warm and dry. Neurological:      General: No focal deficit present. Mental Status: She is alert and oriented to person, place, and time. Mental status is at baseline. Psychiatric:         Mood and Affect: Mood normal.         Behavior: Behavior normal.         Thought Content: Thought content normal.         Judgment: Judgment normal.         ASSESSMENT and PLAN  Diagnoses and all orders for this visit:    1. Essential hypertension, benign  BP is at goal. I do not recommend any change in medications. 2. Hyperlipidemia LDL goal <130  Stable. Patient is tolerating medications with no myalgias. I do not recommend any change in treatment plan. Assured pt that Crestor should not affect her BP. 3. Gastroesophageal reflux disease without esophagitis  Stable and well-managed with no noted flares. No change in medications. 4. Chronic right-sided low back pain with right-sided sciatica  Stable and well-managed with stretching and Ibuprofen. No change in medications. 5. Right hip pain  Not stable. Discussed with pt that her R hip pain is separate from her sciatica. Explained possibility of bursitis or a pulled tendon. Advised pt to f/u with allergist to figure out if there are any steroid injections she could comply with. Rx prednisone to help manage R hip inflammatory pain. Of note, pt says her face becomes flushed with prednisone. Ordered XR to r/o osteoarthritis.  Will continue to monitor for improvements or changes. -     XR HIP RT W OR WO PELV 2-3 VWS; Future  -     methylPREDNISolone (MEDROL DOSEPACK) 4 mg tablet; uad      Lab results and schedule of future lab studies reviewed with patient. Reviewed diet, exercise and weight control. Written by Katie Fuentes, as dictated by Fabiene Skiff, MD.     Current diagnosis and concerns discussed with pt at length. Understands risks and benefits or current treatment plan and medications and accepts the treatment and medication with any possible risks. Pt asks appropriate questions which were answered. Pt instructed to call with any concerns or problems.

## 2020-06-29 ENCOUNTER — TELEPHONE (OUTPATIENT)
Dept: INTERNAL MEDICINE CLINIC | Age: 75
End: 2020-06-29

## 2020-06-30 DIAGNOSIS — F41.9 ANXIETY: ICD-10-CM

## 2020-06-30 LAB — COVID-19, COVID-19: NEGATIVE

## 2020-06-30 RX ORDER — ALPRAZOLAM 0.25 MG/1
TABLET ORAL
Qty: 60 TAB | Refills: 2 | Status: SHIPPED | OUTPATIENT
Start: 2020-06-30 | End: 2021-04-06

## 2020-06-30 NOTE — PROGRESS NOTES
----- Message from Karl Callaway MD sent at 6/30/2020  8:00 AM CDT -----  Please call pt with results of biopsy of the L preauricular cheek from 6/25 which showed a BCC. Please schedule for WLE with me.    Lesion from the L lateral tricep was a benign sun induced mole called a lentigo, NFT needed.    TBSE 6mo   Identified pt with two pt identifiers(name and ). Reviewed record in preparation for visit and have obtained necessary documentation. All patient medications has been reviewed. Chief Complaint   Patient presents with    Hip Pain       There are no preventive care reminders to display for this patient. There were no vitals filed for this visit. 1.Have you traveled outside of the [address] [place] [address] in the last month No    2. Have you been in close contact with someone who is suspected to have COVID-19 or has tested positive. No    3. Do you have any signs or symptoms. ? 4. Have you been to the ER, urgent care clinic since your last visit? Hospitalized since your last visit? No    5. Have you seen or consulted any other health care providers outside of the [address] [place] [address] since your last visit? Include any pap smears or colon screening. No      7. Are you fasting for blood work today? NO    8. Do you have an Advanced Directive/ Living Will in place? YES  If yes, do we have a copy on file NO  If no, would you like information NO    Patient is accompanied by self I have received verbal consent from Damaris Mcclendon to discuss any/all medical information while they are present in the room.

## 2020-07-30 RX ORDER — OLMESARTAN MEDOXOMIL 20 MG/1
TABLET ORAL
Qty: 90 TAB | Refills: 0 | Status: SHIPPED | OUTPATIENT
Start: 2020-07-30 | End: 2020-07-30

## 2020-07-30 RX ORDER — OLMESARTAN MEDOXOMIL 20 MG/1
TABLET ORAL
Qty: 90 TAB | Refills: 0 | Status: SHIPPED | OUTPATIENT
Start: 2020-07-30 | End: 2020-11-09 | Stop reason: SDUPTHER

## 2020-08-07 ENCOUNTER — HOSPITAL ENCOUNTER (EMERGENCY)
Age: 75
Discharge: HOME | End: 2020-08-07
Attending: EMERGENCY MEDICINE
Payer: MEDICARE

## 2020-08-07 VITALS
OXYGEN SATURATION: 99 % | DIASTOLIC BLOOD PRESSURE: 69 MMHG | SYSTOLIC BLOOD PRESSURE: 143 MMHG | TEMPERATURE: 98.1 F | HEART RATE: 86 BPM | RESPIRATION RATE: 18 BRPM

## 2020-08-07 DIAGNOSIS — M25.551 RIGHT HIP PAIN: Primary | ICD-10-CM

## 2020-08-07 PROCEDURE — 99282 EMERGENCY DEPT VISIT SF MDM: CPT

## 2020-08-07 PROCEDURE — 75810000123 HC INJ'S ANES/STEROID AGT PERIPH NERVE

## 2020-08-07 PROCEDURE — 74011000250 HC RX REV CODE- 250: Performed by: EMERGENCY MEDICINE

## 2020-08-07 RX ORDER — BUPIVACAINE HYDROCHLORIDE 5 MG/ML
20 INJECTION, SOLUTION EPIDURAL; INTRACAUDAL
Status: COMPLETED | OUTPATIENT
Start: 2020-08-07 | End: 2020-08-07

## 2020-08-07 RX ADMIN — BUPIVACAINE HYDROCHLORIDE 100 MG: 5 INJECTION, SOLUTION EPIDURAL; INTRACAUDAL at 08:05

## 2020-08-07 NOTE — ED PROVIDER NOTES
73yo female presents with the complain of worsening right hip pain. The pain originally started a couple months ago and has been addressed by her PCP. Patient states that last night the pain went from a 6/10 to 8/10. The pain is located in the lower back, right hip, right groin and radiates down to her knee. The pain is not associated with weakness or numbness. She reports that she is unable to put her shoe on now but still able to walk without assistance. Has a history of osteoarthritis in left knee, shoulders, neck. X-ray from 6/25 shows mild degenerative changes. PCP has prescribed oral steroids and NSAIDs without significant relief, patient has been sent to PT but not yet gone. She reports an allergic reaction to steroid injections received in the shoulder. On exam, she has tenderness at the SI joint and greater trochanter. Denies history of prolonged steroid use, DVTs, fractures.              Past Medical History:   Diagnosis Date    BCC (basal cell carcinoma of skin) 6/8/2015    Constipation     Dyspepsia and other specified disorders of function of stomach     Essential hypertension, benign 1/25/2013    Gastroparesis     GERD (gastroesophageal reflux disease)     Hemorrhoids     Hiatal hernia     Hypertension     Nausea & vomiting     Other and unspecified hyperlipidemia 1/25/2013    Other and unspecified hyperlipidemia 1/25/2013       Past Surgical History:   Procedure Laterality Date    ENDOSCOPY, COLON, DIAGNOSTIC      2011 normal    HX BREAST BIOPSY  2004    LEFT    HX CHOLECYSTECTOMY  2/2014    HX CORNEAL TRANSPLANT Left 1972    HX ORTHOPAEDIC  3/08    RIGHT rotator cuff repair         Family History:   Problem Relation Age of Onset    Stroke Mother         tia    Heart Disease Mother     Cancer Father         NHL    Other Other         amyloid       Social History     Socioeconomic History    Marital status:      Spouse name: Not on file    Number of children: Not on file    Years of education: Not on file    Highest education level: Not on file   Occupational History    Not on file   Social Needs    Financial resource strain: Not on file    Food insecurity     Worry: Not on file     Inability: Not on file    Transportation needs     Medical: Not on file     Non-medical: Not on file   Tobacco Use    Smoking status: Never Smoker    Smokeless tobacco: Never Used   Substance and Sexual Activity    Alcohol use: Yes     Alcohol/week: 2.5 standard drinks     Types: 3 Glasses of wine per week    Drug use: No    Sexual activity: Yes     Partners: Male   Lifestyle    Physical activity     Days per week: Not on file     Minutes per session: Not on file    Stress: Not on file   Relationships    Social connections     Talks on phone: Not on file     Gets together: Not on file     Attends Islam service: Not on file     Active member of club or organization: Not on file     Attends meetings of clubs or organizations: Not on file     Relationship status: Not on file    Intimate partner violence     Fear of current or ex partner: Not on file     Emotionally abused: Not on file     Physically abused: Not on file     Forced sexual activity: Not on file   Other Topics Concern    Not on file   Social History Narrative    Not on file         ALLERGIES: Ciprofloxacin; Other medication; and Pcn [penicillins]    Review of Systems   Constitutional: Negative for fever. Respiratory: Negative for shortness of breath. Gastrointestinal: Negative for abdominal pain. Genitourinary: Positive for flank pain. Negative for dysuria, hematuria and urgency. Musculoskeletal: Positive for arthralgias (right hip), back pain (lower right) and neck pain (chronic). Neurological: Negative for weakness and numbness.        Vitals:    08/07/20 0723 08/07/20 0730 08/07/20 0754 08/07/20 0808   BP: 143/69      Pulse: 86      Resp: 18      Temp: 98.1 °F (36.7 °C)      SpO2: (!) 88% 98% 99% 98% Physical Exam  Constitutional:       Appearance: Normal appearance. She is obese. HENT:      Head: Normocephalic and atraumatic. Nose: Nose normal.      Mouth/Throat:      Mouth: Mucous membranes are moist.      Pharynx: Oropharynx is clear. Eyes:      Extraocular Movements: Extraocular movements intact. Conjunctiva/sclera: Conjunctivae normal.   Neck:      Musculoskeletal: Normal range of motion and neck supple. No neck rigidity or muscular tenderness. Cardiovascular:      Rate and Rhythm: Normal rate and regular rhythm. Pulses: Normal pulses. Heart sounds: Normal heart sounds. Pulmonary:      Effort: Pulmonary effort is normal.      Breath sounds: Normal breath sounds. Abdominal:      Palpations: Abdomen is soft. Tenderness: There is no abdominal tenderness. There is no right CVA tenderness. Hernia: No hernia (negative inguinal or femoral) is present. Musculoskeletal:         General: Tenderness (at right SI joint and greater trochanter ) present. No swelling, deformity or signs of injury. Right lower leg: No edema. Left lower leg: No edema. Comments: Right hip: passive ROM normal.  Pain in groin with right hip flexion. Pain at [facility] joint with ANNIE. Skin:     General: Skin is warm. Findings: No bruising or erythema. Neurological:      General: No focal deficit present. Mental Status: She is alert and oriented to person, place, and time. Sensory: No sensory deficit. Motor: No weakness (5/5 LE strength bilaterally). Deep Tendon Reflexes: Reflexes normal (patellar and achilles 2+). Psychiatric:         Mood and Affect: Mood normal.         Behavior: Behavior normal.          MDM  Number of Diagnoses or Management Options  Right hip pain:   Diagnosis management comments: Xray on 6/25 shows mild degenerative changes. Patient needs orthopedic consult with possibly additional imaging in outpatient setting.   Today we will give a bupvicaine injection at the SI joint trigger point and in the trochanteric bursa for symptom relief. Patient has an existing prescription for Meloxicam, recommended to start today. Follow up with PT and orthopedist.           Procedures    VITAL SIGNS:  Patient Vitals for the past 4 hrs:   Temp Pulse Resp BP SpO2   08/07/20 0808     98 %   08/07/20 0754     99 %   08/07/20 0730     98 %   08/07/20 0723 98.1 °F (36.7 °C) 86 18 143/69 (!) 88 %         LABS:  No results found for this or any previous visit (from the past 6 hour(s)). IMAGING:  No orders to display         Medications During Visit:  Medications   bupivacaine (PF) (MARCAINE) 0.5 % (5 mg/mL) injection 100 mg (100 mg SubCUTAneous Given by Provider 8/7/20 0805)         DECISION MAKING:  [place] Lundy is a 67 y.o. female who comes in as above. IMPRESSION:  1. Right hip pain        DISPOSITION:        Current Discharge Medication List           Follow-up Information    None           The patient is asked to follow-up with their primary care provider in the next several days. They are to call tomorrow for an appointment. The patient is asked to return promptly for any increased concerns or worsening of symptoms. They can return to this emergency department or any other emergency department.

## 2020-08-26 ENCOUNTER — OFFICE VISIT (OUTPATIENT)
Dept: INTERNAL MEDICINE CLINIC | Age: 75
End: 2020-08-26
Payer: MEDICARE

## 2020-08-26 VITALS
TEMPERATURE: 97.7 F | HEART RATE: 83 BPM | BODY MASS INDEX: 31.88 KG/M2 | WEIGHT: 191.6 LBS | SYSTOLIC BLOOD PRESSURE: 116 MMHG | DIASTOLIC BLOOD PRESSURE: 70 MMHG | RESPIRATION RATE: 16 BRPM | OXYGEN SATURATION: 98 %

## 2020-08-26 DIAGNOSIS — I10 ESSENTIAL HYPERTENSION: ICD-10-CM

## 2020-08-26 DIAGNOSIS — M25.511 CHRONIC RIGHT SHOULDER PAIN: Primary | ICD-10-CM

## 2020-08-26 DIAGNOSIS — E78.5 HYPERLIPIDEMIA LDL GOAL <130: ICD-10-CM

## 2020-08-26 DIAGNOSIS — G89.29 CHRONIC RIGHT SHOULDER PAIN: Primary | ICD-10-CM

## 2020-08-26 PROCEDURE — G0463 HOSPITAL OUTPT CLINIC VISIT: HCPCS | Performed by: INTERNAL MEDICINE

## 2020-08-26 PROCEDURE — G8399 PT W/DXA RESULTS DOCUMENT: HCPCS | Performed by: INTERNAL MEDICINE

## 2020-08-26 PROCEDURE — 99214 OFFICE O/P EST MOD 30 MIN: CPT | Performed by: INTERNAL MEDICINE

## 2020-08-26 PROCEDURE — 1090F PRES/ABSN URINE INCON ASSESS: CPT | Performed by: INTERNAL MEDICINE

## 2020-08-26 PROCEDURE — G8754 DIAS BP LESS 90: HCPCS | Performed by: INTERNAL MEDICINE

## 2020-08-26 PROCEDURE — G8419 CALC BMI OUT NRM PARAM NOF/U: HCPCS | Performed by: INTERNAL MEDICINE

## 2020-08-26 PROCEDURE — 1101F PT FALLS ASSESS-DOCD LE1/YR: CPT | Performed by: INTERNAL MEDICINE

## 2020-08-26 PROCEDURE — G8752 SYS BP LESS 140: HCPCS | Performed by: INTERNAL MEDICINE

## 2020-08-26 PROCEDURE — G8432 DEP SCR NOT DOC, RNG: HCPCS | Performed by: INTERNAL MEDICINE

## 2020-08-26 PROCEDURE — G8536 NO DOC ELDER MAL SCRN: HCPCS | Performed by: INTERNAL MEDICINE

## 2020-08-26 PROCEDURE — G8427 DOCREV CUR MEDS BY ELIG CLIN: HCPCS | Performed by: INTERNAL MEDICINE

## 2020-08-26 NOTE — PROGRESS NOTES
HISTORY OF PRESENT ILLNESS  Al Maxwell is a 67 y.o. female. HPI  R shoulder: Not stable with flare starting 6 weeks ago. Pt followed with Dr. Gege Rivers in the past and was informed that she needs a shoulder replacement. Pt declined. Pt continues to experience pain in her R shoulder. Pt was recommended to get steroid injections to manage her pain which she is now considering. She notes that complying with meloxicam for her hip pain provided relief in her R shoulder as well. Pt states that her R shoulder pain starts anteriorly at her collar bone and extends towards her shoulder. Pt reports that her neck ROM is limited. Pt's R shoulder MRI in 09/2019 indicated residual larger full thickness rotator cuff tear affecting the mid to posterior insertions of the supraspinatus tendon and the anterior and mid insertions of the infraspinatus tendon. It also indicated mild atrophy of the supraspinatus muscle and moderate muscle atrophy of the infraspinatus muscle. Additionally there is osteoarthritis of the glenohumeral joint. Hip pain: Followed by Dr. Bruna Buckley. Stable with reported flare. Pt recently presented to the ED 8/7/20 due to severe R hip pain. Pt was directed to comply with meloxicam for 1 week which provided significant relief. Pt notes that complying for 7 days straight caused GI discomfort. Hypertension ROS: taking medications as instructed, no medication side effects noted, no TIA's, no chest pain on exertion, no dyspnea on exertion, no swelling of ankles. Hyperlipidemia:  Cardiovascular risk analysis - 67 y.o. female LDL goal is under 130. ROS: taking medications as instructed, no medication side effects noted, no TIA's, no chest pain on exertion, no dyspnea on exertion, no swelling of ankles. Tolerating meds, no myalgias or other side effects noted. Review of Systems   Musculoskeletal: Positive for joint pain (R side hip and shoulder ).         Mandible pain    All other systems reviewed and are negative. Physical Exam  Vitals signs and nursing note reviewed. Constitutional:       Appearance: Normal appearance. She is normal weight. HENT:      Head: Normocephalic and atraumatic. Right Ear: Tympanic membrane, ear canal and external ear normal.      Left Ear: Tympanic membrane, ear canal and external ear normal.      Nose: Nose normal.      Mouth/Throat:      Mouth: Mucous membranes are dry. Pharynx: Oropharynx is clear. Neck:      Musculoskeletal: Normal range of motion and neck supple. Cardiovascular:      Rate and Rhythm: Normal rate and regular rhythm. Pulses: Normal pulses. Heart sounds: Normal heart sounds. Pulmonary:      Effort: Pulmonary effort is normal.      Breath sounds: Normal breath sounds. Abdominal:      General: Abdomen is flat. Palpations: Abdomen is soft. Musculoskeletal: Normal range of motion. Skin:     General: Skin is warm and dry. Neurological:      General: No focal deficit present. Mental Status: She is alert and oriented to person, place, and time. Mental status is at baseline. Psychiatric:         Mood and Affect: Mood normal.         Behavior: Behavior normal.         Thought Content: Thought content normal.         Judgment: Judgment normal.         ASSESSMENT and PLAN  Diagnoses and all orders for this visit:    1. Chronic right shoulder pain  Not stable with ongoing flare. Directed pt to comply with meloxicam every 3 days to manage her pain and prevent sfx. Advised pt to do PT to prevent frozen shoulder and regain her ROM. Will continue to monitor for improvements or changes. 2. Essential hypertension  BP is at goal. I do not recommend any change in medications. 3. Hyperlipidemia LDL goal <130  Stable, patient is tolerating medications, no myalgias. I do not recommend any change in medications. Lab results and schedule of future lab studies reviewed with patient.   Reviewed diet, exercise and weight control. Written by Carlos Manuel Turner, as dictated by Lucio Marin MD.     Current diagnosis and concerns discussed with pt at length. Understands risks and benefits or current treatment plan and medications and accepts the treatment and medication with any possible risks. Pt asks appropriate questions which were answered. Pt instructed to call with any concerns or problems.

## 2020-11-09 RX ORDER — OLMESARTAN MEDOXOMIL 20 MG/1
20 TABLET ORAL DAILY
Qty: 90 TAB | Refills: 1 | Status: SHIPPED | OUTPATIENT
Start: 2020-11-09 | End: 2020-12-01 | Stop reason: ALTCHOICE

## 2020-11-19 DIAGNOSIS — R05.9 COUGH: Primary | ICD-10-CM

## 2020-12-01 ENCOUNTER — OFFICE VISIT (OUTPATIENT)
Dept: INTERNAL MEDICINE CLINIC | Age: 75
End: 2020-12-01
Payer: MEDICARE

## 2020-12-01 VITALS
TEMPERATURE: 98.1 F | HEART RATE: 66 BPM | OXYGEN SATURATION: 95 % | RESPIRATION RATE: 16 BRPM | BODY MASS INDEX: 31.75 KG/M2 | HEIGHT: 65 IN | SYSTOLIC BLOOD PRESSURE: 129 MMHG | DIASTOLIC BLOOD PRESSURE: 86 MMHG | WEIGHT: 190.6 LBS

## 2020-12-01 DIAGNOSIS — I10 ESSENTIAL HYPERTENSION: ICD-10-CM

## 2020-12-01 DIAGNOSIS — K21.9 GASTROESOPHAGEAL REFLUX DISEASE WITHOUT ESOPHAGITIS: ICD-10-CM

## 2020-12-01 DIAGNOSIS — R73.03 PREDIABETES: ICD-10-CM

## 2020-12-01 DIAGNOSIS — Z00.00 MEDICARE ANNUAL WELLNESS VISIT, SUBSEQUENT: Primary | ICD-10-CM

## 2020-12-01 DIAGNOSIS — K31.84 GASTROPARESIS: ICD-10-CM

## 2020-12-01 DIAGNOSIS — R05.9 COUGH: ICD-10-CM

## 2020-12-01 DIAGNOSIS — E78.5 HYPERLIPIDEMIA LDL GOAL <130: ICD-10-CM

## 2020-12-01 DIAGNOSIS — F41.9 ANXIETY: ICD-10-CM

## 2020-12-01 PROCEDURE — G8399 PT W/DXA RESULTS DOCUMENT: HCPCS | Performed by: INTERNAL MEDICINE

## 2020-12-01 PROCEDURE — G8536 NO DOC ELDER MAL SCRN: HCPCS | Performed by: INTERNAL MEDICINE

## 2020-12-01 PROCEDURE — 1090F PRES/ABSN URINE INCON ASSESS: CPT | Performed by: INTERNAL MEDICINE

## 2020-12-01 PROCEDURE — G0439 PPPS, SUBSEQ VISIT: HCPCS | Performed by: INTERNAL MEDICINE

## 2020-12-01 PROCEDURE — G8419 CALC BMI OUT NRM PARAM NOF/U: HCPCS | Performed by: INTERNAL MEDICINE

## 2020-12-01 PROCEDURE — 99214 OFFICE O/P EST MOD 30 MIN: CPT | Performed by: INTERNAL MEDICINE

## 2020-12-01 PROCEDURE — G8754 DIAS BP LESS 90: HCPCS | Performed by: INTERNAL MEDICINE

## 2020-12-01 PROCEDURE — G8752 SYS BP LESS 140: HCPCS | Performed by: INTERNAL MEDICINE

## 2020-12-01 PROCEDURE — 1101F PT FALLS ASSESS-DOCD LE1/YR: CPT | Performed by: INTERNAL MEDICINE

## 2020-12-01 PROCEDURE — G0463 HOSPITAL OUTPT CLINIC VISIT: HCPCS | Performed by: INTERNAL MEDICINE

## 2020-12-01 PROCEDURE — G8427 DOCREV CUR MEDS BY ELIG CLIN: HCPCS | Performed by: INTERNAL MEDICINE

## 2020-12-01 PROCEDURE — G8432 DEP SCR NOT DOC, RNG: HCPCS | Performed by: INTERNAL MEDICINE

## 2020-12-01 RX ORDER — AMLODIPINE BESYLATE 5 MG/1
5 TABLET ORAL DAILY
Qty: 30 TAB | Refills: 1 | Status: SHIPPED | OUTPATIENT
Start: 2020-12-01 | End: 2020-12-07 | Stop reason: SDUPTHER

## 2020-12-01 NOTE — PROGRESS NOTES
This is the Subsequent Medicare Annual Wellness Exam, performed 12 months or more after the Initial AWV or the last Subsequent AWV    I have reviewed the patient's medical history in detail and updated the computerized patient record. Depression Risk Factor Screening:     3 most recent PHQ Screens 6/25/2020   Little interest or pleasure in doing things Not at all   Feeling down, depressed, irritable, or hopeless Not at all   Total Score PHQ 2 0       Alcohol Risk Screen   Do you average more than 1 drink per night or more than 7 drinks a week:  No    On any one occasion in the past three months have you have had more than 3 drinks containing alcohol:  No        Functional Ability and Level of Safety:   Hearing: Hearing is good. Activities of Daily Living: The home contains: no safety equipment. Patient does total self care     Ambulation: with no difficulty     Fall Risk:  Fall Risk Assessment, last 12 mths 6/25/2020   Able to walk? Yes   Fall in past 12 months? No     Abuse Screen:  Patient is not abused       Cognitive Screening   Has your family/caregiver stated any concerns about your memory: no     Cognitive Screening: Normal - MMSE (Mini Mental Status Exam)    Assessment/Plan   Education and counseling provided:  Are appropriate based on today's review and evaluation    Diagnoses and all orders for this visit:    1. Cough    2. Essential hypertension  -     METABOLIC PANEL, COMPREHENSIVE; Future    3. Hyperlipidemia LDL goal <130  -     LIPID PANEL; Future    4. Anxiety    5. Prediabetes  -     HEMOGLOBIN A1C WITH EAG; Future    6. Gastroesophageal reflux disease without esophagitis  -     MAGNESIUM; Future    7. Gastroparesis    Other orders  -     amLODIPine (NORVASC) 5 mg tablet; Take 1 Tab by mouth daily.         Health Maintenance Due     Health Maintenance Due   Topic Date Due    GLAUCOMA SCREENING Q2Y  07/28/2018    Medicare Yearly Exam  11/12/2020       Patient Care Team   Patient Care Team:  Ez Cristobal MD as PCP - General (Internal Medicine)  Ez Cristobal MD as PCP - [facility] Empaneled Provider  Chandu Wright MD (Gynecology)  Rosario Martines MD (Ophthalmology)  Tiburcio Lyon MD (Orthopedic Surgery)    History     Patient Active Problem List   Diagnosis Code    Essential hypertension, benign I10    Hyperlipidemia LDL goal <130 E78.5    BCC (basal cell carcinoma of skin) C44.91    Gastroparesis K31.84    Advanced care planning/counseling discussion Z71.89    Nuclear cataract [id number]    Sore throat J02.9     Past Medical History:   Diagnosis Date    BCC (basal cell carcinoma of skin) 6/8/2015    Constipation     Dyspepsia and other specified disorders of function of stomach     Essential hypertension, benign 1/25/2013    Gastroparesis     GERD (gastroesophageal reflux disease)     Hemorrhoids     Hiatal hernia     Hypertension     Nausea & vomiting     Other and unspecified hyperlipidemia 1/25/2013    Other and unspecified hyperlipidemia 1/25/2013      Past Surgical History:   Procedure Laterality Date    ENDOSCOPY, COLON, DIAGNOSTIC      2011 normal    HX BREAST BIOPSY  2004    LEFT    HX CHOLECYSTECTOMY  2/2014    HX CORNEAL TRANSPLANT Left 1972    HX ORTHOPAEDIC  3/08    RIGHT rotator cuff repair     Current Outpatient Medications   Medication Sig Dispense Refill    amLODIPine (NORVASC) 5 mg tablet Take 1 Tab by mouth daily. 30 Tab 1    meloxicam 15 mg TbDi Take 15 mg by mouth daily.  ALPRAZolam (XANAX) 0.25 mg tablet TAKE 1 TABLET BY MOUTH TWICE DAILY AS NEEDED 60 Tab 2    rosuvastatin (CRESTOR) 5 mg tablet Take 1 Tab by mouth nightly. 90 Tab 1    ERYPED 200 200 mg/5 mL suspension   2    Emollient Combination No.43 (PROMISEB) crea by Apply Externally route.  esomeprazole (NEXIUM) 40 mg capsule Take 40 mg by mouth two (2) times a day.  BIOTIN PO Take 1,000 mcg by mouth daily.       valACYclovir (VALTREX) 500 mg tablet Take  by mouth two (2) times a day.  aspirin 81 mg tablet Take 81 mg by mouth daily.  magnesium 250 mg Tab Take 1 Tab by mouth daily.  multivitamin (ONE A DAY) tablet Take 1 Tab by mouth daily.  omega-3 fatty acids-vitamin e (FISH OIL) 1,000 mg cap Take 1 Cap by mouth daily. Allergies   Allergen Reactions    Ciprofloxacin Swelling    Other Medication Swelling     Steroid shots    Pcn [Penicillins] Rash       Family History   Problem Relation Age of Onset    Stroke Mother         [place]    Heart Disease Mother     Cancer Father         NHL    Other Other         amyloid     Social History     Tobacco Use    Smoking status: Never Smoker    Smokeless tobacco: Never Used   Substance Use Topics    Alcohol use:  Yes     Alcohol/week: 2.5 standard drinks     Types: 3 Glasses of wine per week

## 2020-12-01 NOTE — PROGRESS NOTES
HISTORY OF PRESENT ILLNESS  Patricia Jensen is a 67 y.o. female. HPI  Hypertension ROS: no TIA's, no chest pain on exertion, no dyspnea on exertion, no swelling of ankles. New concerns: BP in office today is 129/86. Continues on Benicar. Hyperlipidemia:  Cardiovascular risk analysis - 67 y.o. female LDL goal is under 130. ROS: taking medications as instructed, no medication side effects noted, no TIA's, no chest pain on exertion, no dyspnea on exertion, no swelling of ankles. Tolerating meds, no myalgias or other side effects noted. New concerns: Pt's last LDL was 76 on 5/19/20. Continues on Crestor. Cough: Pt reports that she has ongoing cough. Pt's last chest XR was normal. Pt DC her Lipitor and her cough improved. She notes that she followed with GI who discussed with pt that staying on Eryped BID may have helped manage her cough since she has underlying gastroparesis. She states that her cough returned when she started complying with Crestor. Pt reports that she held her Benicar for 2 days and her cough improved but she resumed compliance since her BP became elevated (140/89). She describes her cough as a \"heaving\" cough. Gastroparesis: Followed by GI. Stable, pt continues to comply with Eryped BID. Anxiety: Stable, pt continues to comply with Xanax. Prediabetes: Managed by diet and exercise. Review of Systems   Respiratory: Positive for cough. All other systems reviewed and are negative. Physical Exam  Vitals signs and nursing note reviewed. Constitutional:       Appearance: Normal appearance. She is normal weight. HENT:      Head: Normocephalic and atraumatic. Right Ear: Tympanic membrane, ear canal and external ear normal.      Left Ear: Tympanic membrane, ear canal and external ear normal.      Nose: Nose normal.      Mouth/Throat:      Mouth: Mucous membranes are dry. Pharynx: Oropharynx is clear.    Neck:      Musculoskeletal: Normal range of motion and neck supple. Cardiovascular:      Rate and Rhythm: Normal rate and regular rhythm. Pulses: Normal pulses. Heart sounds: Normal heart sounds. Pulmonary:      Effort: Pulmonary effort is normal.      Breath sounds: Normal breath sounds. Abdominal:      General: Abdomen is flat. Palpations: Abdomen is soft. Musculoskeletal: Normal range of motion. Skin:     General: Skin is warm and dry. Neurological:      General: No focal deficit present. Mental Status: She is alert and oriented to person, place, and time. Mental status is at baseline. Psychiatric:         Mood and Affect: Mood normal.         Behavior: Behavior normal.         Thought Content: Thought content normal.         Judgment: Judgment normal.         ASSESSMENT and PLAN  Diagnoses and all orders for this visit:    1. Cough  Pt has ongoing cough. Directed pt to DC Benicar. Discussed with pt that she can try holding her Crestor for a few weeks after complying with amlodipine for at least 3 weeks if she feels she still has a cough after stopping the benicar. Had conversation with pt that Astepro can help manage cough. Informed pt that Astepro can leave bad taste in the mouth and be sedating. Discussed with pt that we can consider adding Astepro to her regimen in a few weeks since I do not want to change her medication regimen drastically all at once. Will continue to monitor for improvements or changes. 2. Essential hypertension  Pt's BP is at goal. Directed pt to DC Benicar since she observed improvement in cough when she held it for 2 days. Had conversation with pt that she does need to be on BP medication. Pt agrees. Rx amlodipine 5 mg. Will continue to monitor for improvements or changes. 3. Hyperlipidemia LDL goal <130  Presumed stable, will recheck labs today. Patient is tolerating medications with no myalgias. I do not recommend any change in treatment plan. 4. Anxiety  Stable and well-managed.  No change in medications. 5. Prediabetes  Sugars presumed stable, will recheck today. Continue to follow diabetic diet and monitor sugars. 6. Gastroesophageal reflux disease without esophagitis  Stable and well-managed. No change in medications. 7. Gastroparesis  Stable and well-managed. No change in medications. Other orders  -     amLODIPine (NORVASC) 5 mg tablet; Take 1 Tab by mouth daily. Lab results and schedule of future lab studies reviewed with patient. Reviewed diet, exercise and weight control. Written by Jona Pepper, as dictated by Leanna Pichardo MD.     Current diagnosis and concerns discussed with pt at length. Understands risks and benefits or current treatment plan and medications and accepts the treatment and medication with any possible risks. Pt asks appropriate questions which were answered. Pt instructed to call with any concerns or problems.

## 2020-12-01 NOTE — PATIENT INSTRUCTIONS
Medicare Wellness Visit, Female The best way to live healthy is to have a lifestyle where you eat a well-balanced diet, exercise regularly, limit alcohol use, and quit all forms of tobacco/nicotine, if applicable. Regular preventive services are another way to keep healthy. Preventive services (vaccines, screening tests, monitoring & exams) can help personalize your care plan, which helps you manage your own care. Screening tests can find health problems at the earliest stages, when they are easiest to treat. [facility] follows the current, evidence-based guidelines published by the [facility] ([facility]) when recommending preventive services for our patients. Because we follow these guidelines, sometimes recommendations change over time as research supports it. (For example, mammograms used to be recommended annually. Even though Medicare will still pay for an annual mammogram, the newer guidelines recommend a mammogram every two years for women of average risk). Of course, you and your doctor may decide to screen more often for some diseases, based on your risk and your co-morbidities (chronic disease you are already diagnosed with). Preventive services for you include: - Medicare offers their members a free annual wellness visit, which is time for you and your primary care provider to discuss and plan for your preventive service needs. Take advantage of this benefit every year! 
-All adults over the age of 72 should receive the recommended pneumonia vaccines. Current USPSTF guidelines recommend a series of two vaccines for the best pneumonia protection.  
-All adults should have a flu vaccine yearly and a tetanus vaccine every 10 years.  
-All adults age 48 and older should receive the shingles vaccines (series of two vaccines). -All adults age 38-68 who are overweight should have a diabetes screening test once every three years. -All adults born between [birth year] and 1966 should be screened once for Hepatitis C. 
-Other screening tests and preventive services for persons with diabetes include: an eye exam to screen for diabetic retinopathy, a kidney function test, a foot exam, and stricter control over your cholesterol.  
-Cardiovascular screening for adults with routine risk involves an electrocardiogram (ECG) at intervals determined by your doctor.  
-Colorectal cancer screenings should be done for adults age 54-65 with no increased risk factors for colorectal cancer. There are a number of acceptable methods of screening for this type of cancer. Each test has its own benefits and drawbacks. Discuss with your doctor what is most appropriate for you during your annual wellness visit. The different tests include: colonoscopy (considered the best screening method), a fecal occult blood test, a fecal DNA test, and sigmoidoscopy. 
 
-A bone mass density test is recommended when a woman turns 65 to screen for osteoporosis. This test is only recommended one time, as a screening. Some providers will use this same test as a disease monitoring tool if you already have osteoporosis. -Breast cancer screenings are recommended every other year for women of normal risk, age 54-69. 
-Cervical cancer screenings for women over age 72 are only recommended with certain risk factors. Here is a list of your current Health Maintenance items (your personalized list of preventive services) with a due date: 
Health Maintenance Due Topic Date Due  Glaucoma Screening   07/28/2018 Marycruz Emanuel Annual Well Visit  11/12/2020

## 2020-12-07 RX ORDER — AMLODIPINE BESYLATE 5 MG/1
5 TABLET ORAL DAILY
Qty: 90 TAB | Refills: 0 | Status: SHIPPED | OUTPATIENT
Start: 2020-12-07 | End: 2021-08-02

## 2020-12-08 ENCOUNTER — HOSPITAL ENCOUNTER (OUTPATIENT)
Dept: LAB | Age: 75
Discharge: HOME | End: 2020-12-08
Payer: MEDICARE

## 2020-12-08 PROCEDURE — 80061 LIPID PANEL: CPT

## 2020-12-08 PROCEDURE — 36415 COLL VENOUS BLD VENIPUNCTURE: CPT

## 2020-12-08 PROCEDURE — 80053 COMPREHEN METABOLIC PANEL: CPT

## 2020-12-08 PROCEDURE — 83735 ASSAY OF MAGNESIUM: CPT

## 2020-12-08 PROCEDURE — 83036 HEMOGLOBIN GLYCOSYLATED A1C: CPT

## 2020-12-09 LAB
ALBUMIN SERPL-MCNC: 4.4 G/DL (ref 3.7–4.7)
ALBUMIN/GLOB SERPL: 1.6 {RATIO} (ref 1.2–2.2)
ALP SERPL-CCNC: 104 IU/L (ref 39–117)
ALT SERPL-CCNC: 18 IU/L (ref 0–32)
AST SERPL-CCNC: 21 IU/L (ref 0–40)
BILIRUB SERPL-MCNC: 0.4 MG/DL (ref 0–1.2)
BUN SERPL-MCNC: 19 MG/DL (ref 8–27)
BUN/CREAT SERPL: 19 (ref 12–28)
CALCIUM SERPL-MCNC: 9.6 MG/DL (ref 8.7–10.3)
CHLORIDE SERPL-SCNC: 103 MMOL/L (ref 96–106)
CHOLEST SERPL-MCNC: 175 MG/DL (ref 100–199)
CO2 SERPL-SCNC: 24 MMOL/L (ref 20–29)
CREAT SERPL-MCNC: 0.98 MG/DL (ref 0.57–1)
EST. AVERAGE GLUCOSE BLD GHB EST-MCNC: 123 MG/DL
GLOBULIN SER CALC-MCNC: 2.7 G/DL (ref 1.5–4.5)
GLUCOSE SERPL-MCNC: 105 MG/DL (ref 65–99)
HBA1C MFR BLD: 5.9 % (ref 4.8–5.6)
HDLC SERPL-MCNC: 74 MG/DL
INTERPRETATION, 910389: NORMAL
INTERPRETATION: NORMAL
LDLC SERPL CALC-MCNC: 75 MG/DL (ref 0–99)
MAGNESIUM SERPL-MCNC: 1.9 MG/DL (ref 1.6–2.3)
PDF IMAGE, 910387: NORMAL
POTASSIUM SERPL-SCNC: 4.2 MMOL/L (ref 3.5–5.2)
PROT SERPL-MCNC: 7.1 G/DL (ref 6–8.5)
SODIUM SERPL-SCNC: 143 MMOL/L (ref 134–144)
TRIGL SERPL-MCNC: 152 MG/DL (ref 0–149)
VLDLC SERPL CALC-MCNC: 26 MG/DL (ref 5–40)

## 2021-01-19 RX ORDER — ROSUVASTATIN CALCIUM 5 MG/1
5 TABLET, COATED ORAL
Qty: 90 TAB | Refills: 0 | Status: SHIPPED | OUTPATIENT
Start: 2021-01-19 | End: 2021-05-10

## 2021-01-26 ENCOUNTER — IMMUNIZATION (OUTPATIENT)
Dept: INTERNAL MEDICINE CLINIC | Age: 76
End: 2021-01-26
Payer: MEDICARE

## 2021-01-26 DIAGNOSIS — Z23 ENCOUNTER FOR IMMUNIZATION: Primary | ICD-10-CM

## 2021-01-26 PROCEDURE — 0011A PR IMM ADMN SARSCOV2 100 MCG/0.5 ML 1ST DOSE: CPT | Performed by: FAMILY MEDICINE

## 2021-01-26 PROCEDURE — 91301 COVID-19, MRNA, LNP-S, PF, 100MCG/0.5ML DOSE(MODERNA): CPT | Performed by: FAMILY MEDICINE

## 2021-02-23 ENCOUNTER — IMMUNIZATION (OUTPATIENT)
Dept: INTERNAL MEDICINE CLINIC | Age: 76
End: 2021-02-23
Payer: MEDICARE

## 2021-02-23 DIAGNOSIS — Z23 ENCOUNTER FOR IMMUNIZATION: Primary | ICD-10-CM

## 2021-02-23 PROCEDURE — 91301 COVID-19, MRNA, LNP-S, PF, 100MCG/0.5ML DOSE(MODERNA): CPT | Performed by: FAMILY MEDICINE

## 2021-02-23 PROCEDURE — 0012A COVID-19, MRNA, LNP-S, PF, 100MCG/0.5ML DOSE(MODERNA): CPT | Performed by: FAMILY MEDICINE

## 2021-04-06 DIAGNOSIS — F41.9 ANXIETY: ICD-10-CM

## 2021-04-06 RX ORDER — ALPRAZOLAM 0.25 MG/1
TABLET ORAL
Qty: 60 TAB | Refills: 0 | Status: SHIPPED | OUTPATIENT
Start: 2021-04-06 | End: 2021-10-13

## 2021-05-10 RX ORDER — ROSUVASTATIN CALCIUM 5 MG/1
TABLET, COATED ORAL
Qty: 90 TAB | Refills: 0 | Status: SHIPPED | OUTPATIENT
Start: 2021-05-10 | End: 2021-07-29

## 2021-07-13 RX ORDER — OLMESARTAN MEDOXOMIL 20 MG/1
TABLET ORAL
Qty: 90 TABLET | Refills: 1 | Status: SHIPPED | OUTPATIENT
Start: 2021-07-13 | End: 2021-07-29 | Stop reason: SDUPTHER

## 2021-07-29 ENCOUNTER — TELEPHONE (OUTPATIENT)
Dept: INTERNAL MEDICINE CLINIC | Age: 76
End: 2021-07-29

## 2021-07-29 RX ORDER — OLMESARTAN MEDOXOMIL 20 MG/1
20 TABLET ORAL DAILY
Qty: 90 TABLET | Refills: 1 | Status: SHIPPED | OUTPATIENT
Start: 2021-07-29 | End: 2022-02-16 | Stop reason: SDUPTHER

## 2021-07-29 RX ORDER — ROSUVASTATIN CALCIUM 5 MG/1
5 TABLET, COATED ORAL
Qty: 90 TABLET | Refills: 1 | Status: SHIPPED | OUTPATIENT
Start: 2021-07-29 | End: 2022-02-10 | Stop reason: SDUPTHER

## 2021-07-29 RX ORDER — ROSUVASTATIN CALCIUM 5 MG/1
TABLET, COATED ORAL
Qty: 90 TABLET | Refills: 1 | Status: SHIPPED | OUTPATIENT
Start: 2021-07-29 | End: 2021-08-02 | Stop reason: SDUPTHER

## 2021-07-29 NOTE — TELEPHONE ENCOUNTER
PSR reached out to schedule patient for medication refill appointment - she is scheduled to come in Monday August 2nd at 9:45, please refill

## 2021-08-02 ENCOUNTER — OFFICE VISIT (OUTPATIENT)
Dept: INTERNAL MEDICINE CLINIC | Age: 76
End: 2021-08-02
Payer: MEDICARE

## 2021-08-02 VITALS
BODY MASS INDEX: 33.09 KG/M2 | HEART RATE: 76 BPM | HEIGHT: 65 IN | TEMPERATURE: 97.9 F | WEIGHT: 198.6 LBS | DIASTOLIC BLOOD PRESSURE: 77 MMHG | SYSTOLIC BLOOD PRESSURE: 114 MMHG | RESPIRATION RATE: 16 BRPM | OXYGEN SATURATION: 96 %

## 2021-08-02 DIAGNOSIS — K31.84 GASTROPARESIS: ICD-10-CM

## 2021-08-02 DIAGNOSIS — G89.29 CHRONIC LEFT-SIDED LOW BACK PAIN WITH LEFT-SIDED SCIATICA: Primary | ICD-10-CM

## 2021-08-02 DIAGNOSIS — E78.5 HYPERLIPIDEMIA LDL GOAL <130: ICD-10-CM

## 2021-08-02 DIAGNOSIS — M54.42 CHRONIC LEFT-SIDED LOW BACK PAIN WITH LEFT-SIDED SCIATICA: Primary | ICD-10-CM

## 2021-08-02 DIAGNOSIS — G47.9 SLEEP DISORDER: ICD-10-CM

## 2021-08-02 DIAGNOSIS — E55.9 VITAMIN D DEFICIENCY: ICD-10-CM

## 2021-08-02 DIAGNOSIS — I10 ESSENTIAL HYPERTENSION: ICD-10-CM

## 2021-08-02 DIAGNOSIS — K21.9 GASTROESOPHAGEAL REFLUX DISEASE WITHOUT ESOPHAGITIS: ICD-10-CM

## 2021-08-02 DIAGNOSIS — R73.03 PREDIABETES: ICD-10-CM

## 2021-08-02 DIAGNOSIS — M79.605 LEFT LEG PAIN: ICD-10-CM

## 2021-08-02 PROCEDURE — G8399 PT W/DXA RESULTS DOCUMENT: HCPCS | Performed by: INTERNAL MEDICINE

## 2021-08-02 PROCEDURE — 1090F PRES/ABSN URINE INCON ASSESS: CPT | Performed by: INTERNAL MEDICINE

## 2021-08-02 PROCEDURE — G0463 HOSPITAL OUTPT CLINIC VISIT: HCPCS | Performed by: INTERNAL MEDICINE

## 2021-08-02 PROCEDURE — 1101F PT FALLS ASSESS-DOCD LE1/YR: CPT | Performed by: INTERNAL MEDICINE

## 2021-08-02 PROCEDURE — G8754 DIAS BP LESS 90: HCPCS | Performed by: INTERNAL MEDICINE

## 2021-08-02 PROCEDURE — G8510 SCR DEP NEG, NO PLAN REQD: HCPCS | Performed by: INTERNAL MEDICINE

## 2021-08-02 PROCEDURE — G8536 NO DOC ELDER MAL SCRN: HCPCS | Performed by: INTERNAL MEDICINE

## 2021-08-02 PROCEDURE — 99214 OFFICE O/P EST MOD 30 MIN: CPT | Performed by: INTERNAL MEDICINE

## 2021-08-02 PROCEDURE — G8752 SYS BP LESS 140: HCPCS | Performed by: INTERNAL MEDICINE

## 2021-08-02 PROCEDURE — G8427 DOCREV CUR MEDS BY ELIG CLIN: HCPCS | Performed by: INTERNAL MEDICINE

## 2021-08-02 PROCEDURE — G8417 CALC BMI ABV UP PARAM F/U: HCPCS | Performed by: INTERNAL MEDICINE

## 2021-08-02 RX ORDER — DEXLANSOPRAZOLE 60 MG/1
1 CAPSULE, DELAYED RELEASE ORAL 2 TIMES DAILY
COMMUNITY
Start: 2021-02-04

## 2021-08-02 NOTE — PROGRESS NOTES
Jareth García (: 1945) is a 67 y.o. female, established patient, here for evaluation of the following chief complaint(s):  Medication Evaluation (bp and mri results)       ASSESSMENT/PLAN:  Below is the assessment and plan developed based on review of pertinent history, physical exam, labs, studies, and medications. 1. Chronic left-sided low back pain with left-sided sciatica  Followed by Dr. Cesar Poe. Will continue to monitor for improvements or changes. 2. Essential hypertension  -     METABOLIC PANEL, COMPREHENSIVE; Future  -     CBC W/O DIFF; Future  BP is at goal. I do not recommend any change in medications. Continue with ongoing regimen of Benicar and amlodipine. 3. Hyperlipidemia LDL goal <130  -     LIPID PANEL; Future  Presumed stable, will recheck labs today. Patient is tolerating medications with no myalgias. I do not recommend any change in treatment plan. Continue with ongoing regimen of Crestor. 4. Left leg pain  Followed by Dr. Cesar Poe. Will continue to monitor for improvements or changes. 5. Gastroparesis  Stable and well-managed. Continue with ongoing regimen of healthy diet. 6. Gastroesophageal reflux disease without esophagitis  Stable and well-managed. Continue with ongoing regimen of Dexilant. 7. Sleep disorder  Stable and well-managed. Continue with ongoing regimen of Xanax PRN. 8. Prediabetes  -     HEMOGLOBIN A1C WITH EAG; Future  Presumed stable, continue with ongoing regimen of exercise and healthy diet. 9. Vitamin D deficiency  -     VITAMIN D, 25 HYDROXY; Future  I will check to pt's vitamin D level at her request. Treatment/suggestions dependent on results. No follow-ups on file. SUBJECTIVE/OBJECTIVE:  HPI    Arthritis: Pt is being followed by Dr. Cesar Poe. She underwent an MRI of her back and hip which were indicative of mild to severe arthritis. She states that her hip pain \"goes all of the way down to my feet. \" Pt has been informed that her pain is stemming from her back. She was rx gabapentin by pain management, which alleviated her sx but made her \"feel funny,\" so she is planning to discontinue. Pt endorses relief of her hip pain from tylenol and ibuprofen. Of note, she has been told that her L meniscus. Gastroparesis: Pt states that her sx are well-managed by her diet. She states that her sx are coming in \"episodes,\" but their frequency has decreased. Hypertension ROS: taking medications as instructed, no medication side effects noted, no TIA's, no chest pain on exertion, no dyspnea on exertion, no swelling of ankles. BP in office today is 114/77. Sleep: Pt is taking xanax to sleep when her hip is bothering her. She states that is helps. GERD: Pt is still taking her current regimen of dexilant. Review of Systems   Musculoskeletal: Positive for arthralgias (Back and hip ) and back pain. All other systems reviewed and are negative. Physical Exam  Constitutional:       Appearance: Normal appearance. HENT:      Right Ear: Tympanic membrane and external ear normal.      Left Ear: Tympanic membrane and external ear normal.      Mouth/Throat:      Mouth: Mucous membranes are moist.      Pharynx: Oropharynx is clear. Cardiovascular:      Rate and Rhythm: Normal rate and regular rhythm. Pulses: Normal pulses. Heart sounds: Normal heart sounds. Pulmonary:      Effort: Pulmonary effort is normal.      Breath sounds: Normal breath sounds. Musculoskeletal:         General: Normal range of motion. Skin:     General: Skin is warm and dry. Neurological:      General: No focal deficit present. Mental Status: She is alert and oriented to person, place, and time.    Psychiatric:         Mood and Affect: Mood normal.         Behavior: Behavior normal.       On this date 08/02/2021 I have spent 30 minutes reviewing previous notes, test results and face to face with the patient discussing the diagnosis and importance of compliance with the treatment plan as well as documenting on the day of the visit. An electronic signature was used to authenticate this note. Written by Veatrice Goldmann as dictated by Dr. Alexandria Harper.    -- Veatrice Goldmann

## 2021-08-07 LAB
25(OH)D3+25(OH)D2 SERPL-MCNC: 28.4 NG/ML (ref 30–100)
ALBUMIN SERPL-MCNC: 4.5 G/DL (ref 3.7–4.7)
ALBUMIN/GLOB SERPL: 1.7 {RATIO} (ref 1.2–2.2)
ALP SERPL-CCNC: 103 IU/L (ref 48–121)
ALT SERPL-CCNC: 17 IU/L (ref 0–32)
AST SERPL-CCNC: 23 IU/L (ref 0–40)
BILIRUB SERPL-MCNC: 0.6 MG/DL (ref 0–1.2)
BUN SERPL-MCNC: 18 MG/DL (ref 8–27)
BUN/CREAT SERPL: 19 (ref 12–28)
CALCIUM SERPL-MCNC: 9.6 MG/DL (ref 8.7–10.3)
CHLORIDE SERPL-SCNC: 101 MMOL/L (ref 96–106)
CHOLEST SERPL-MCNC: 158 MG/DL (ref 100–199)
CO2 SERPL-SCNC: 25 MMOL/L (ref 20–29)
CREAT SERPL-MCNC: 0.97 MG/DL (ref 0.57–1)
ERYTHROCYTE [DISTWIDTH] IN BLOOD BY AUTOMATED COUNT: 13 % (ref 11.7–15.4)
EST. AVERAGE GLUCOSE BLD GHB EST-MCNC: 123 MG/DL
GLOBULIN SER CALC-MCNC: 2.7 G/DL (ref 1.5–4.5)
GLUCOSE SERPL-MCNC: 109 MG/DL (ref 65–99)
HBA1C MFR BLD: 5.9 % (ref 4.8–5.6)
HCT VFR BLD AUTO: 42 % (ref 34–46.6)
HDLC SERPL-MCNC: 63 MG/DL
HGB BLD-MCNC: 14.2 G/DL (ref 11.1–15.9)
IMP & REVIEW OF LAB RESULTS: NORMAL
INTERPRETATION: NORMAL
LDLC SERPL CALC-MCNC: 74 MG/DL (ref 0–99)
MCH RBC QN AUTO: 31.7 PG (ref 26.6–33)
MCHC RBC AUTO-ENTMCNC: 33.8 G/DL (ref 31.5–35.7)
MCV RBC AUTO: 94 FL (ref 79–97)
PLATELET # BLD AUTO: 253 X10E3/UL (ref 150–450)
POTASSIUM SERPL-SCNC: 4.3 MMOL/L (ref 3.5–5.2)
PROT SERPL-MCNC: 7.2 G/DL (ref 6–8.5)
RBC # BLD AUTO: 4.48 X10E6/UL (ref 3.77–5.28)
SODIUM SERPL-SCNC: 139 MMOL/L (ref 134–144)
TRIGL SERPL-MCNC: 117 MG/DL (ref 0–149)
VLDLC SERPL CALC-MCNC: 21 MG/DL (ref 5–40)
WBC # BLD AUTO: 8.9 X10E3/UL (ref 3.4–10.8)

## 2021-10-13 DIAGNOSIS — F41.9 ANXIETY: ICD-10-CM

## 2021-10-13 RX ORDER — ALPRAZOLAM 0.25 MG/1
TABLET ORAL
Qty: 60 TABLET | Refills: 1 | Status: SHIPPED | OUTPATIENT
Start: 2021-10-13 | End: 2022-05-02

## 2022-01-05 ENCOUNTER — PATIENT MESSAGE (OUTPATIENT)
Dept: INTERNAL MEDICINE CLINIC | Age: 77
End: 2022-01-05

## 2022-02-10 RX ORDER — ROSUVASTATIN CALCIUM 5 MG/1
5 TABLET, COATED ORAL
Qty: 90 TABLET | Refills: 1 | Status: SHIPPED | OUTPATIENT
Start: 2022-02-10 | End: 2022-08-11

## 2022-02-15 NOTE — PROGRESS NOTES
Shayla Ty (: 1945) is a 67 y.o. female, established patient, here for evaluation of the following chief complaint(s):  Follow-up       ASSESSMENT/PLAN:  Below is the assessment and plan developed based on review of pertinent history, physical exam, labs, studies, and medications. 1. Medicare annual wellness visit, subsequent  2. Essential hypertension  -     CBC W/O DIFF; Future  -     METABOLIC PANEL, COMPREHENSIVE; Future  -     olmesartan (BENICAR) 20 mg tablet; Take 1 Tablet by mouth daily. , Normal, Disp-90 Tablet, R-1  BP is at goal. I requested that she continue monitoring her BP at home and reduce her regimen if her pressure continues to drop as she loses weight. do not recommend any change in medications (Benicar). 3. Hyperlipidemia LDL goal <130  -     LIPID PANEL; Future  Presumed stable, will recheck labs today. Patient is tolerating medications with no myalgias. I do not recommend any change in treatment plan (Crestor and dexilant). 4. Gastroparesis  Stable and followed by GI, continue with diet monitoring and taking Erythromycin PRN. 5. Gastroesophageal reflux disease without esophagitis  -     MAGNESIUM; Future  Stable and followed by GI. 6. Sleep disorder  Stable and well-managed. Continue with ongoing regimen of Xanax PRN. 7. Prediabetes  -     HEMOGLOBIN A1C WITH EAG; Future  BG presumed stable, will recheck today. No follow-ups on file. SUBJECTIVE/OBJECTIVE:  HPI    Gastroparesis: Pt reports indigestion and pain when she eats a high amount of vegetables, which she has eaten more of in hopes of losing weight. She incorporates her vegetables into soups to avoid flares. Pt takes her regimen PRN when her food \"feels stuck. \"     Hypertension ROS: taking medications as instructed, no medication side effects noted, no TIA's, no chest pain on exertion, no dyspnea on exertion, no swelling of ankles. BP in office today is 99/65.  She states that her BP at home is ~120's/70's. Mood: Pt notes a stable mood as of late. Insomnia: Pt reports infrequent use of her xanax for sleeping. Hyperlipidemia:  Cardiovascular risk analysis - 67 y.o. female LDL goal is under 130. ROS: taking medications as instructed, no medication side effects noted, no TIA's, no chest pain on exertion, no dyspnea on exertion, no swelling of ankles. Tolerating meds, no myalgias or other side effects noted. New concerns: Pt's last LDL was 74 on 8/6/21. She expresses concern about her magnesium secondary to Dexalant. Pt notes decreasing Lipitor when she takes Erythromycin. Review of Systems   All other systems reviewed and are negative. Physical Exam  Constitutional:       Appearance: Normal appearance. HENT:      Right Ear: Tympanic membrane and external ear normal.      Left Ear: Tympanic membrane and external ear normal.      Mouth/Throat:      Mouth: Mucous membranes are moist.      Pharynx: Oropharynx is clear. Cardiovascular:      Rate and Rhythm: Normal rate and regular rhythm. Pulses: Normal pulses. Heart sounds: Normal heart sounds. Pulmonary:      Effort: Pulmonary effort is normal.      Breath sounds: Normal breath sounds. Musculoskeletal:         General: Normal range of motion. Skin:     General: Skin is warm and dry. Neurological:      General: No focal deficit present. Mental Status: She is alert and oriented to person, place, and time. Psychiatric:         Mood and Affect: Mood normal.         Behavior: Behavior normal.       On this date 02/16/2022 I have spent 30 minutes reviewing previous notes, test results and face to face with the patient discussing the diagnosis and importance of compliance with the treatment plan as well as documenting on the day of the visit. An electronic signature was used to authenticate this note. Written by Manolo Melendez as dictated by Dr. Valerio Marmolejo.    -- Manolo Melendez

## 2022-02-16 ENCOUNTER — OFFICE VISIT (OUTPATIENT)
Dept: INTERNAL MEDICINE CLINIC | Age: 77
End: 2022-02-16
Payer: MEDICARE

## 2022-02-16 VITALS
BODY MASS INDEX: 32.19 KG/M2 | WEIGHT: 193.2 LBS | HEART RATE: 83 BPM | RESPIRATION RATE: 16 BRPM | HEIGHT: 65 IN | OXYGEN SATURATION: 97 % | TEMPERATURE: 98.1 F | DIASTOLIC BLOOD PRESSURE: 65 MMHG | SYSTOLIC BLOOD PRESSURE: 99 MMHG

## 2022-02-16 DIAGNOSIS — E78.5 HYPERLIPIDEMIA LDL GOAL <130: ICD-10-CM

## 2022-02-16 DIAGNOSIS — K31.84 GASTROPARESIS: ICD-10-CM

## 2022-02-16 DIAGNOSIS — I10 ESSENTIAL HYPERTENSION: ICD-10-CM

## 2022-02-16 DIAGNOSIS — Z00.00 MEDICARE ANNUAL WELLNESS VISIT, SUBSEQUENT: Primary | ICD-10-CM

## 2022-02-16 DIAGNOSIS — G89.29 CHRONIC LEFT-SIDED LOW BACK PAIN WITH LEFT-SIDED SCIATICA: ICD-10-CM

## 2022-02-16 DIAGNOSIS — R73.03 PREDIABETES: ICD-10-CM

## 2022-02-16 DIAGNOSIS — G47.9 SLEEP DISORDER: ICD-10-CM

## 2022-02-16 DIAGNOSIS — K21.9 GASTROESOPHAGEAL REFLUX DISEASE WITHOUT ESOPHAGITIS: ICD-10-CM

## 2022-02-16 DIAGNOSIS — M54.42 CHRONIC LEFT-SIDED LOW BACK PAIN WITH LEFT-SIDED SCIATICA: ICD-10-CM

## 2022-02-16 PROCEDURE — G8427 DOCREV CUR MEDS BY ELIG CLIN: HCPCS | Performed by: INTERNAL MEDICINE

## 2022-02-16 PROCEDURE — G8417 CALC BMI ABV UP PARAM F/U: HCPCS | Performed by: INTERNAL MEDICINE

## 2022-02-16 PROCEDURE — G8754 DIAS BP LESS 90: HCPCS | Performed by: INTERNAL MEDICINE

## 2022-02-16 PROCEDURE — G0439 PPPS, SUBSEQ VISIT: HCPCS | Performed by: INTERNAL MEDICINE

## 2022-02-16 PROCEDURE — 1090F PRES/ABSN URINE INCON ASSESS: CPT | Performed by: INTERNAL MEDICINE

## 2022-02-16 PROCEDURE — G8536 NO DOC ELDER MAL SCRN: HCPCS | Performed by: INTERNAL MEDICINE

## 2022-02-16 PROCEDURE — G8510 SCR DEP NEG, NO PLAN REQD: HCPCS | Performed by: INTERNAL MEDICINE

## 2022-02-16 PROCEDURE — 99214 OFFICE O/P EST MOD 30 MIN: CPT | Performed by: INTERNAL MEDICINE

## 2022-02-16 PROCEDURE — 1101F PT FALLS ASSESS-DOCD LE1/YR: CPT | Performed by: INTERNAL MEDICINE

## 2022-02-16 PROCEDURE — G8399 PT W/DXA RESULTS DOCUMENT: HCPCS | Performed by: INTERNAL MEDICINE

## 2022-02-16 PROCEDURE — G8752 SYS BP LESS 140: HCPCS | Performed by: INTERNAL MEDICINE

## 2022-02-16 PROCEDURE — G0463 HOSPITAL OUTPT CLINIC VISIT: HCPCS | Performed by: INTERNAL MEDICINE

## 2022-02-16 RX ORDER — PIMECROLIMUS 10 MG/G
CREAM TOPICAL
COMMUNITY
Start: 2022-02-10

## 2022-02-16 RX ORDER — OLMESARTAN MEDOXOMIL 20 MG/1
20 TABLET ORAL DAILY
Qty: 90 TABLET | Refills: 1 | Status: SHIPPED | OUTPATIENT
Start: 2022-02-16 | End: 2022-08-26

## 2022-02-16 NOTE — PROGRESS NOTES
This is the Subsequent Medicare Annual Wellness Exam, performed 12 months or more after the Initial AWV or the last Subsequent AWV    I have reviewed the patient's medical history in detail and updated the computerized patient record. Assessment/Plan   Education and counseling provided:  Are appropriate based on today's review and evaluation    1. Essential hypertension  -     CBC W/O DIFF; Future  -     METABOLIC PANEL, COMPREHENSIVE; Future  -     olmesartan (BENICAR) 20 mg tablet; Take 1 Tablet by mouth daily. , Normal, Disp-90 Tablet, R-1  2. Hyperlipidemia LDL goal <130  -     LIPID PANEL; Future  3. Gastroparesis  4. Gastroesophageal reflux disease without esophagitis  -     MAGNESIUM; Future  5. Chronic left-sided low back pain with left-sided sciatica  6. Sleep disorder  7. Prediabetes  -     HEMOGLOBIN A1C WITH EAG; Future       Depression Risk Factor Screening     3 most recent PHQ Screens 2/16/2022   Little interest or pleasure in doing things Not at all   Feeling down, depressed, irritable, or hopeless Not at all   Total Score PHQ 2 0       Alcohol & Drug Abuse Risk Screen    Do you average more than 1 drink per night or more than 7 drinks a week:  No    On any one occasion in the past three months have you have had more than 3 drinks containing alcohol:  Yes          Functional Ability and Level of Safety    Hearing: Hearing is good. Activities of Daily Living: The home contains: no safety equipment. Patient does total self care      Ambulation: with no difficulty     Fall Risk:  Fall Risk Assessment, last 12 mths 2/16/2022   Able to walk? Yes   Fall in past 12 months? 0   Do you feel unsteady?  0   Are you worried about falling 0      Abuse Screen:  Patient is not abused       Cognitive Screening    Has your family/caregiver stated any concerns about your memory: no     Cognitive Screening: Normal - MMSE (Mini Mental Status Exam)    Health Maintenance Due     Health Maintenance Due   Topic Date Due    Hepatitis C Screening  Never done    Medicare Yearly Exam  12/02/2021       Patient Care Team   Patient Care Team:  Mimi Warren MD as PCP - General (Internal Medicine)  Mimi Warren MD as PCP - [address] [place] [address] Provider  Elisabet Carmichael MD (Gynecology)  Lali Seymour MD (Ophthalmology)  Mamie Romero MD (Orthopedic Surgery)    History     Patient Active Problem List   Diagnosis Code    Essential hypertension, benign I10    Hyperlipidemia LDL goal <130 E78.5    BCC (basal cell carcinoma of skin) C44.91    Gastroparesis K31.84    Advanced care planning/counseling discussion Z71.89    Nuclear cataract [id number]    Sore throat J02.9     Past Medical History:   Diagnosis Date    BCC (basal cell carcinoma of skin) 6/8/2015    Constipation     Dyspepsia and other specified disorders of function of stomach     Essential hypertension, benign 1/25/2013    Gastroparesis     GERD (gastroesophageal reflux disease)     Hemorrhoids     Hiatal hernia     Hypertension     Nausea & vomiting     Other and unspecified hyperlipidemia 1/25/2013    Other and unspecified hyperlipidemia 1/25/2013      Past Surgical History:   Procedure Laterality Date    ENDOSCOPY, COLON, DIAGNOSTIC      2011 normal    HX BREAST BIOPSY  2004    LEFT    HX CHOLECYSTECTOMY  2/2014    HX CORNEAL TRANSPLANT Left 1972    HX ORTHOPAEDIC  3/08    RIGHT rotator cuff repair     Current Outpatient Medications   Medication Sig Dispense Refill    pimecrolimus (ELIDEL) 1 % topical cream APPLY TOPICALLY TO RASH TWICE DAILY      olmesartan (BENICAR) 20 mg tablet Take 1 Tablet by mouth daily. 90 Tablet 1    rosuvastatin (CRESTOR) 5 mg tablet Take 1 Tablet by mouth nightly. 90 Tablet 1    ALPRAZolam (XANAX) 0.25 mg tablet TAKE 1 TABLET BY MOUTH TWICE DAILY AS NEEDED 60 Tablet 1    dexlansoprazole (Dexilant) 60 mg CpDB capsule (delayed release) Take 1 Capsule by mouth two (2) times a day.       ERYPED 200 200 mg/5 mL suspension   2    BIOTIN PO Take 1,000 mcg by mouth daily.  valACYclovir (VALTREX) 500 mg tablet Take  by mouth two (2) times a day.  aspirin 81 mg tablet Take 81 mg by mouth daily.  magnesium 250 mg Tab Take 1 Tab by mouth daily.  multivitamin (ONE A DAY) tablet Take 1 Tab by mouth daily.  omega-3 fatty acids-vitamin e (FISH OIL) 1,000 mg cap Take 1 Cap by mouth daily. Allergies   Allergen Reactions    Ciprofloxacin Swelling    Other Medication Swelling     Steroid shots    Pcn [Penicillins] Rash       Family History   Problem Relation Age of Onset    Stroke Mother         Ward Oden    Heart Disease Mother     Cancer Father         NHL    Other Other         amyloid     Social History     Tobacco Use    Smoking status: Never Smoker    Smokeless tobacco: Never Used   Substance Use Topics    Alcohol use:  Yes     Alcohol/week: 2.5 standard drinks     Types: 3 Glasses of wine per week         Maria Isabel Boogie MD

## 2022-02-16 NOTE — PATIENT INSTRUCTIONS

## 2022-02-23 LAB
ALBUMIN SERPL-MCNC: 4.4 G/DL (ref 3.7–4.7)
ALBUMIN/GLOB SERPL: 1.6 {RATIO} (ref 1.2–2.2)
ALP SERPL-CCNC: 112 IU/L (ref 44–121)
ALT SERPL-CCNC: 20 IU/L (ref 0–32)
AST SERPL-CCNC: 23 IU/L (ref 0–40)
BILIRUB SERPL-MCNC: 0.4 MG/DL (ref 0–1.2)
BUN SERPL-MCNC: 18 MG/DL (ref 8–27)
BUN/CREAT SERPL: 17 (ref 12–28)
CALCIUM SERPL-MCNC: 9.6 MG/DL (ref 8.7–10.3)
CHLORIDE SERPL-SCNC: 100 MMOL/L (ref 96–106)
CHOLEST SERPL-MCNC: 150 MG/DL (ref 100–199)
CO2 SERPL-SCNC: 24 MMOL/L (ref 20–29)
CREAT SERPL-MCNC: 1.05 MG/DL (ref 0.57–1)
ERYTHROCYTE [DISTWIDTH] IN BLOOD BY AUTOMATED COUNT: 13.1 % (ref 11.7–15.4)
EST. AVERAGE GLUCOSE BLD GHB EST-MCNC: 120 MG/DL
GLOBULIN SER CALC-MCNC: 2.8 G/DL (ref 1.5–4.5)
GLUCOSE SERPL-MCNC: 105 MG/DL (ref 65–99)
HBA1C MFR BLD: 5.8 % (ref 4.8–5.6)
HCT VFR BLD AUTO: 42.6 % (ref 34–46.6)
HDLC SERPL-MCNC: 66 MG/DL
HGB BLD-MCNC: 13.9 G/DL (ref 11.1–15.9)
IMP & REVIEW OF LAB RESULTS: NORMAL
INTERPRETATION: NORMAL
LDLC SERPL CALC-MCNC: 66 MG/DL (ref 0–99)
MAGNESIUM SERPL-MCNC: 2 MG/DL (ref 1.6–2.3)
MCH RBC QN AUTO: 31 PG (ref 26.6–33)
MCHC RBC AUTO-ENTMCNC: 32.6 G/DL (ref 31.5–35.7)
MCV RBC AUTO: 95 FL (ref 79–97)
PLATELET # BLD AUTO: 226 X10E3/UL (ref 150–450)
POTASSIUM SERPL-SCNC: 4.7 MMOL/L (ref 3.5–5.2)
PROT SERPL-MCNC: 7.2 G/DL (ref 6–8.5)
RBC # BLD AUTO: 4.48 X10E6/UL (ref 3.77–5.28)
SODIUM SERPL-SCNC: 140 MMOL/L (ref 134–144)
TRIGL SERPL-MCNC: 101 MG/DL (ref 0–149)
VLDLC SERPL CALC-MCNC: 18 MG/DL (ref 5–40)
WBC # BLD AUTO: 9.2 X10E3/UL (ref 3.4–10.8)

## 2022-03-19 PROBLEM — J02.9 SORE THROAT: Status: ACTIVE | Noted: 2020-05-15

## 2022-05-02 DIAGNOSIS — F41.9 ANXIETY: ICD-10-CM

## 2022-05-02 RX ORDER — ALPRAZOLAM 0.25 MG/1
TABLET ORAL
Qty: 60 TABLET | Refills: 0 | Status: SHIPPED | OUTPATIENT
Start: 2022-05-02 | End: 2022-10-30

## 2022-08-11 RX ORDER — ROSUVASTATIN CALCIUM 5 MG/1
TABLET, COATED ORAL
Qty: 90 TABLET | Refills: 1 | Status: SHIPPED | OUTPATIENT
Start: 2022-08-11

## 2022-08-26 DIAGNOSIS — I10 ESSENTIAL HYPERTENSION: ICD-10-CM

## 2022-08-26 RX ORDER — OLMESARTAN MEDOXOMIL 20 MG/1
20 TABLET ORAL DAILY
Qty: 90 TABLET | Refills: 1 | Status: SHIPPED | OUTPATIENT
Start: 2022-08-26

## 2022-09-19 NOTE — PROGRESS NOTES
Bob Martínez (: 1945) is a 65 y.o. female, established patient, here for evaluation of the following chief complaint(s):  Follow-up (Follow up on medication)       ASSESSMENT/PLAN:  Below is the assessment and plan developed based on review of pertinent history, physical exam, labs, studies, and medications. 1. Gastroparesis-patient has a prescription for erythromycin but she has not felt the need to take it and she continues to monitor her food choices  2. Essential hypertension-she is at goal on Benicar 20 mg once a day  -     METABOLIC PANEL, COMPREHENSIVE; Future  3. Hyperlipidemia LDL goal <130-stable but we will recheck labs today continue with Crestor 5 mg  -     LIPID PANEL; Future  4. Sleep disorder-takes Xanax as needed but not on a daily basis and has had good results  5. Prediabetes-continue to work on eating right and exercise-check A1c so that we can monitor her insulin resistance and her glucose  -     HEMOGLOBIN A1C WITH EAG; Future  6. Gastroesophageal reflux disease without esophagitis-according to her last EGD she still had signs of Mata's esophagus and was instructed to take Dexilant twice a day as well as Pepcid 2 times a day  -     MAGNESIUM; Future  7. Pain in both lower extremities-I suspect that the pain in her legs may be coming from her Crestor so we are going to have her hold it for couple weeks and see if it improves, if it does she is going to message me and I might have her take it every other day with co-Q10. If there is no change in her leg pain then we need to start looking at her hips and back as a cause and potentially start with physical therapy    No follow-ups on file. SUBJECTIVE/OBJECTIVE:  HPI    Gastroparesis: Pt reports indigestion and pain when she eats a high amount of vegetables, which she has eaten more of in hopes of losing weight. She incorporates her vegetables into soups to avoid flares. Pt takes her regimen PRN when her food \"feels stuck. \" She does take dexilant in consistent pattern - she had endoscopy in [place] and added pepcid ; and he feels cough from the acid     She is trying to watch sugars and processed food      Hypertension ROS: taking medications as instructed, no medication side effects noted, no TIA's, no chest pain on exertion, no dyspnea on exertion, no swelling of ankles. BP in office today is   She states that her BP at home is  114/78      Mood: Pt notes a stable mood as of late. Insomnia: Pt reports infrequent use of her xanax for sleeping. Hyperlipidemia:  Cardiovascular risk analysis - 67 y.o. female LDL goal is under 130. ROS: taking medications as instructed, no medication side effects noted, no TIA's, no chest pain on exertion, no dyspnea on exertion, no swelling of ankles. Tolerating meds, New concerns: she has leg pain and it is near hips and down ands tarting PT for her neck and wonders if she should PT for her hips and back   Lab Results   Component Value Date/Time    Cholesterol, total 150 02/22/2022 09:12 AM    HDL Cholesterol 66 02/22/2022 09:12 AM    LDL, calculated 66 02/22/2022 09:12 AM    LDL, calculated 76 05/19/2020 10:43 AM    VLDL, calculated 18 02/22/2022 09:12 AM    VLDL, calculated 29 05/19/2020 10:43 AM    Triglyceride 101 02/22/2022 09:12 AM          Review of Systems   All other systems reviewed and are negative. Physical Exam  Vitals and nursing note reviewed. Constitutional:       Appearance: She is well-developed. HENT:      Head: Normocephalic and atraumatic. Right Ear: External ear normal.      Left Ear: External ear normal.      Nose: Nose normal.   Eyes:      Conjunctiva/sclera: Conjunctivae normal.   Neck:      Thyroid: No thyroid mass or thyromegaly. Vascular: No carotid bruit. Cardiovascular:      Rate and Rhythm: Normal rate and regular rhythm. Heart sounds: Normal heart sounds.    Pulmonary:      Effort: Pulmonary effort is normal.      Breath sounds: Normal breath sounds. Abdominal:      General: Bowel sounds are normal.      Palpations: Abdomen is soft. Musculoskeletal:         General: Normal range of motion. Cervical back: Normal range of motion and neck supple. Skin:     General: Skin is warm and dry. Findings: No abrasion or rash. Neurological:      Mental Status: She is alert and oriented to person, place, and time. Cranial Nerves: No cranial nerve deficit. Sensory: No sensory deficit. Coordination: Coordination normal.   Psychiatric:         Behavior: Behavior normal.         Thought Content: Thought content normal.         Judgment: Judgment normal.       On this date 09/22/2022 I have spent 30 minutes reviewing previous notes, test results and face to face with the patient discussing the diagnosis and importance of compliance with the treatment plan as well as documenting on the day of the visit. An electronic signature was used to authenticate this note.   -- Ayan Guevara MD

## 2022-09-22 ENCOUNTER — OFFICE VISIT (OUTPATIENT)
Dept: INTERNAL MEDICINE CLINIC | Age: 77
End: 2022-09-22
Payer: MEDICARE

## 2022-09-22 VITALS
HEIGHT: 65 IN | BODY MASS INDEX: 31.99 KG/M2 | TEMPERATURE: 97.8 F | WEIGHT: 192 LBS | RESPIRATION RATE: 14 BRPM | SYSTOLIC BLOOD PRESSURE: 114 MMHG | OXYGEN SATURATION: 96 % | HEART RATE: 79 BPM | DIASTOLIC BLOOD PRESSURE: 78 MMHG

## 2022-09-22 DIAGNOSIS — M79.604 PAIN IN BOTH LOWER EXTREMITIES: ICD-10-CM

## 2022-09-22 DIAGNOSIS — M79.605 PAIN IN BOTH LOWER EXTREMITIES: ICD-10-CM

## 2022-09-22 DIAGNOSIS — R73.03 PREDIABETES: ICD-10-CM

## 2022-09-22 DIAGNOSIS — K31.84 GASTROPARESIS: Primary | ICD-10-CM

## 2022-09-22 DIAGNOSIS — G47.9 SLEEP DISORDER: ICD-10-CM

## 2022-09-22 DIAGNOSIS — E78.5 HYPERLIPIDEMIA LDL GOAL <130: ICD-10-CM

## 2022-09-22 DIAGNOSIS — K21.9 GASTROESOPHAGEAL REFLUX DISEASE WITHOUT ESOPHAGITIS: ICD-10-CM

## 2022-09-22 DIAGNOSIS — I10 ESSENTIAL HYPERTENSION: ICD-10-CM

## 2022-09-22 PROCEDURE — G8752 SYS BP LESS 140: HCPCS | Performed by: INTERNAL MEDICINE

## 2022-09-22 PROCEDURE — G8536 NO DOC ELDER MAL SCRN: HCPCS | Performed by: INTERNAL MEDICINE

## 2022-09-22 PROCEDURE — 99214 OFFICE O/P EST MOD 30 MIN: CPT | Performed by: INTERNAL MEDICINE

## 2022-09-22 PROCEDURE — G8399 PT W/DXA RESULTS DOCUMENT: HCPCS | Performed by: INTERNAL MEDICINE

## 2022-09-22 PROCEDURE — G8427 DOCREV CUR MEDS BY ELIG CLIN: HCPCS | Performed by: INTERNAL MEDICINE

## 2022-09-22 PROCEDURE — G8754 DIAS BP LESS 90: HCPCS | Performed by: INTERNAL MEDICINE

## 2022-09-22 PROCEDURE — G8510 SCR DEP NEG, NO PLAN REQD: HCPCS | Performed by: INTERNAL MEDICINE

## 2022-09-22 PROCEDURE — 1101F PT FALLS ASSESS-DOCD LE1/YR: CPT | Performed by: INTERNAL MEDICINE

## 2022-09-22 PROCEDURE — G0463 HOSPITAL OUTPT CLINIC VISIT: HCPCS | Performed by: INTERNAL MEDICINE

## 2022-09-22 PROCEDURE — 1090F PRES/ABSN URINE INCON ASSESS: CPT | Performed by: INTERNAL MEDICINE

## 2022-09-22 PROCEDURE — G8417 CALC BMI ABV UP PARAM F/U: HCPCS | Performed by: INTERNAL MEDICINE

## 2022-09-22 RX ORDER — FAMOTIDINE 20 MG/1
20 TABLET, FILM COATED ORAL
COMMUNITY
Start: 2022-08-26

## 2022-10-01 LAB
ALBUMIN SERPL-MCNC: 4.7 G/DL (ref 3.7–4.7)
ALBUMIN/GLOB SERPL: 1.7 {RATIO} (ref 1.2–2.2)
ALP SERPL-CCNC: 111 IU/L (ref 44–121)
ALT SERPL-CCNC: 15 IU/L (ref 0–32)
AST SERPL-CCNC: 18 IU/L (ref 0–40)
BILIRUB SERPL-MCNC: 0.4 MG/DL (ref 0–1.2)
BUN SERPL-MCNC: 23 MG/DL (ref 8–27)
BUN/CREAT SERPL: 24 (ref 12–28)
CALCIUM SERPL-MCNC: 9.6 MG/DL (ref 8.7–10.3)
CHLORIDE SERPL-SCNC: 102 MMOL/L (ref 96–106)
CHOLEST SERPL-MCNC: 165 MG/DL (ref 100–199)
CO2 SERPL-SCNC: 24 MMOL/L (ref 20–29)
CREAT SERPL-MCNC: 0.97 MG/DL (ref 0.57–1)
EGFR: 60 ML/MIN/1.73
EST. AVERAGE GLUCOSE BLD GHB EST-MCNC: 128 MG/DL
GLOBULIN SER CALC-MCNC: 2.7 G/DL (ref 1.5–4.5)
GLUCOSE SERPL-MCNC: 104 MG/DL (ref 70–99)
HBA1C MFR BLD: 6.1 % (ref 4.8–5.6)
HDLC SERPL-MCNC: 59 MG/DL
IMP & REVIEW OF LAB RESULTS: NORMAL
LDLC SERPL CALC-MCNC: 83 MG/DL (ref 0–99)
MAGNESIUM SERPL-MCNC: 2 MG/DL (ref 1.6–2.3)
POTASSIUM SERPL-SCNC: 4.5 MMOL/L (ref 3.5–5.2)
PROT SERPL-MCNC: 7.4 G/DL (ref 6–8.5)
SODIUM SERPL-SCNC: 140 MMOL/L (ref 134–144)
TRIGL SERPL-MCNC: 135 MG/DL (ref 0–149)
VLDLC SERPL CALC-MCNC: 23 MG/DL (ref 5–40)

## 2022-10-30 DIAGNOSIS — F41.9 ANXIETY: ICD-10-CM

## 2022-10-30 RX ORDER — ALPRAZOLAM 0.25 MG/1
TABLET ORAL
Qty: 60 TABLET | Refills: 2 | Status: SHIPPED | OUTPATIENT
Start: 2022-10-30

## 2023-03-09 DIAGNOSIS — I10 ESSENTIAL HYPERTENSION: ICD-10-CM

## 2023-03-10 RX ORDER — OLMESARTAN MEDOXOMIL 20 MG/1
20 TABLET ORAL DAILY
Qty: 90 TABLET | Refills: 1 | Status: SHIPPED | OUTPATIENT
Start: 2023-03-10

## 2023-03-12 RX ORDER — ROSUVASTATIN CALCIUM 5 MG/1
TABLET, COATED ORAL
Qty: 90 TABLET | Refills: 1 | Status: SHIPPED | OUTPATIENT
Start: 2023-03-12

## 2023-03-27 NOTE — PROGRESS NOTES
Azael Mera (: 1945) is a 65 y.o. female, established patient, here for evaluation of the following chief complaint(s):  Follow-up (Med refill)       ASSESSMENT/PLAN:  Below is the assessment and plan developed based on review of pertinent history, physical exam, labs, studies, and medications. 1. Medicare annual wellness visit, subsequent  2. Gastroparesis- stable and has not had to take emycin in a year   3. Essential hypertension- at goal on benicar   4. Hyperlipidemia LDL goal <130- tolerating crestor   5. Sleep disorder-cont with xanax as needed   6. Prediabetes- eating right ; check labs  7. Gastroesophageal reflux disease without esophagitis-cont with dexilant in morning and pepcid at night   8. Pain in both lower extremities- better on crestor is better   9. Chronic cough-this seems to be coming from her esophagus and due to acid because even though she was on Dexilant they did an EGD they still found esophagitis. I explained I do not think that there is going to be a great medicine that can help with this cough. We can try Tessalon Perles if it is more of a tickle in the upper throat and if not we can try gabapentin if it is nerve related but I do not have high expectations in terms of their efficacy to solve the problem  -     benzonatate (TESSALON) 200 mg capsule; Take 1 Capsule by mouth three (3) times daily as needed for Cough for up to 10 days. , Normal, Disp-30 Capsule, R-3  Can try gabapentin if that doesn't work   No follow-ups on file. Erythromycin  Benicar  Crestor  Xanax as needed  Eating right  Dexilant Pepcid  Questionable related to Crestor questionable better  SUBJECTIVE/OBJECTIVE:  HPI  Gastroparesis: Pt reports indigestion and pain when she eats a high amount of vegetables, which she has eaten more of in hopes of losing weight. She incorporates her vegetables into soups to avoid flares. Pt takes her regimen PRN when her food \"feels stuck. \"      She does take dexilant in consistent pattern - she had endoscopy in [place] and added pepcid ; and he feels cough from the acid ; she has coughed continously until pain in ribs; she cannot suppress it and food doesn't seem to go down      She is trying to watch sugars and processed food        Hypertension ROS: taking medications as instructed, no medication side effects noted, no TIA's, no chest pain on exertion, no dyspnea on exertion, no swelling of ankles. BP in office today is   She states that her BP at home is        Mood: Pt notes a stable mood as of late. Insomnia: Pt reports infrequent use of her xanax for sleeping. Hyperlipidemia:  Cardiovascular risk analysis - 67 y.o. female LDL goal is under 130. ROS: taking medications as instructed, no medication side effects noted, no TIA's, no chest pain on exertion, no dyspnea on exertion, no swelling of ankles. Tolerating meds, New concerns: she has leg pain last time and we discussed crestor being cause   Lab Results   Component Value Date/Time    Cholesterol, total 165 09/30/2022 09:30 AM    HDL Cholesterol 59 09/30/2022 09:30 AM    LDL, calculated 83 09/30/2022 09:30 AM    LDL, calculated 76 05/19/2020 10:43 AM    VLDL, calculated 23 09/30/2022 09:30 AM    VLDL, calculated 29 05/19/2020 10:43 AM    Triglyceride 135 09/30/2022 09:30 AM        Review of Systems   All other systems reviewed and are negative. Physical Exam  Vitals and nursing note reviewed. Constitutional:       Appearance: She is well-developed. HENT:      Head: Normocephalic and atraumatic. Right Ear: External ear normal.      Left Ear: External ear normal.      Nose: Nose normal.   Neck:      Thyroid: No thyroid mass or thyromegaly. Vascular: No carotid bruit or JVD. Cardiovascular:      Rate and Rhythm: Normal rate and regular rhythm. Pulses: Normal pulses. Heart sounds: Normal heart sounds, S1 normal and S2 normal. No murmur heard. No friction rub. No gallop.    Pulmonary: Effort: Pulmonary effort is normal.      Breath sounds: Normal breath sounds. Abdominal:      General: Bowel sounds are normal.      Palpations: Abdomen is soft. Musculoskeletal:         General: Normal range of motion. Cervical back: Normal range of motion and neck supple. Skin:     General: Skin is warm and dry. Neurological:      Mental Status: She is alert and oriented to person, place, and time. Psychiatric:         Behavior: Behavior normal.         Thought Content: Thought content normal.         Judgment: Judgment normal.         On this date 03/28/2023 I have spent 30 minutes reviewing previous notes, test results and face to face with the patient discussing the diagnosis and importance of compliance with the treatment plan as well as documenting on the day of the visit. An electronic signature was used to authenticate this note. -- Caryn Kimball MD   This is the Subsequent Medicare Annual Wellness Exam, performed 12 months or more after the Initial AWV or the last Subsequent AWV    I have reviewed the patient's medical history in detail and updated the computerized patient record. Assessment/Plan   Education and counseling provided:  Are appropriate based on today's review and evaluation    1. Medicare annual wellness visit, subsequent  2. Gastroparesis  3. Essential hypertension  4. Hyperlipidemia LDL goal <130  5. Sleep disorder  6. Prediabetes  7. Gastroesophageal reflux disease without esophagitis  8. Pain in both lower extremities  9. Chronic cough  -     benzonatate (TESSALON) 200 mg capsule; Take 1 Capsule by mouth three (3) times daily as needed for Cough for up to 10 days. , Normal, Disp-30 Capsule, R-3       Depression Risk Factor Screening     3 most recent PHQ Screens 3/28/2023   Little interest or pleasure in doing things Not at all   Feeling down, depressed, irritable, or hopeless Not at all   Total Score PHQ 2 0       Alcohol & Drug Abuse Risk Screen    Do you average more than 1 drink per night or more than 7 drinks a week:  No    On any one occasion in the past three months have you have had more than 3 drinks containing alcohol:  No          Functional Ability and Level of Safety    Hearing: Hearing is good. Activities of Daily Living: The home contains: no safety equipment. Patient does total self care      Ambulation: with no difficulty     Fall Risk:  Fall Risk Assessment, last 12 mths 3/28/2023   Able to walk? Yes   Fall in past 12 months? 0   Do you feel unsteady?  0   Are you worried about falling 0      Abuse Screen:  Patient is not abused       Cognitive Screening    Has your family/caregiver stated any concerns about your memory: no     Cognitive Screening: Normal - MMSE (Mini Mental Status Exam)    Health Maintenance Due     Health Maintenance Due   Topic Date Due    Hepatitis C Screening  Never done    COVID-19 Vaccine (5 - Booster for Celetse Sharps series) 08/10/2022    Medicare Yearly Exam  02/17/2023       Patient Care Team   Patient Care Team:  Herminia Ken MD as PCP - General (Internal Medicine Physician)  Herminia Ken MD as PCP - [facility] Empaneled Provider  Debbie Champagne MD (Gynecology)  Nader Solano MD (Ophthalmology)  Soy Howell MD (Orthopedic Surgery)    History     Patient Active Problem List   Diagnosis Code    Essential hypertension, benign I10    Hyperlipidemia LDL goal <130 E78.5    BCC (basal cell carcinoma of skin) C44.91    Gastroparesis K31.84    Advanced care planning/counseling discussion Z71.89    Nuclear cataract [id number]    Sore throat J02.9     Past Medical History:   Diagnosis Date    BCC (basal cell carcinoma of skin) 6/8/2015    Constipation     Dyspepsia and other specified disorders of function of stomach     Essential hypertension, benign 1/25/2013    Gastroparesis     GERD (gastroesophageal reflux disease)     Hemorrhoids     Hiatal hernia     Hypertension     Nausea & vomiting     Other and unspecified hyperlipidemia 1/25/2013    Other and unspecified hyperlipidemia 1/25/2013      Past Surgical History:   Procedure Laterality Date    ENDOSCOPY, COLON, DIAGNOSTIC      2011 normal    HX BREAST BIOPSY  2004    LEFT    HX CHOLECYSTECTOMY  2/2014    HX CORNEAL TRANSPLANT Left 1972    HX ORTHOPAEDIC  3/08    RIGHT rotator cuff repair     Current Outpatient Medications   Medication Sig Dispense Refill    benzonatate (TESSALON) 200 mg capsule Take 1 Capsule by mouth three (3) times daily as needed for Cough for up to 10 days. 30 Capsule 3    rosuvastatin (CRESTOR) 5 mg tablet TAKE 1 TABLET BY MOUTH EVERY NIGHT 90 Tablet 1    olmesartan (BENICAR) 20 mg tablet TAKE 1 TABLET BY MOUTH DAILY 90 Tablet 1    ALPRAZolam (XANAX) 0.25 mg tablet TAKE 1 TABLET BY MOUTH TWICE DAILY AS NEEDED 60 Tablet 2    famotidine (PEPCID) 20 mg tablet Take 20 mg by mouth nightly. pimecrolimus (ELIDEL) 1 % topical cream APPLY TOPICALLY TO RASH TWICE DAILY      dexlansoprazole (Dexilant) 60 mg CpDB capsule (delayed release) Take 1 Capsule by mouth daily. valACYclovir (VALTREX) 500 mg tablet Take  by mouth two (2) times a day. aspirin 81 mg tablet Take 81 mg by mouth daily. magnesium 250 mg Tab Take 1 Tab by mouth daily. multivitamin (ONE A DAY) tablet Take 1 Tab by mouth daily. ERYPED 200 200 mg/5 mL suspension   2     Allergies   Allergen Reactions    Ciprofloxacin Swelling    Other Medication Swelling     Steroid shots    Pcn [Penicillins] Rash       Family History   Problem Relation Age of Onset    Stroke Mother         tia    Heart Disease Mother     Cancer Father         NHL    Other Other         amyloid     Social History     Tobacco Use    Smoking status: Never    Smokeless tobacco: Never   Substance Use Topics    Alcohol use:  Yes     Alcohol/week: 2.5 standard drinks     Types: 3 Glasses of wine per week         Renee Osorio MD

## 2023-03-27 NOTE — PATIENT INSTRUCTIONS
Medicare Wellness Visit, Female     The best way to live healthy is to have a lifestyle where you eat a well-balanced diet, exercise regularly, limit alcohol use, and quit all forms of tobacco/nicotine, if applicable. Regular preventive services are another way to keep healthy. Preventive services (vaccines, screening tests, monitoring & exams) can help personalize your care plan, which helps you manage your own care. Screening tests can find health problems at the earliest stages, when they are easiest to treat. [facility] follows the current, evidence-based guidelines published by the [facility] ([facility]) when recommending preventive services for our patients. Because we follow these guidelines, sometimes recommendations change over time as research supports it. (For example, mammograms used to be recommended annually. Even though Medicare will still pay for an annual mammogram, the newer guidelines recommend a mammogram every two years for women of average risk). Of course, you and your doctor may decide to screen more often for some diseases, based on your risk and your co-morbidities (chronic disease you are already diagnosed with). Preventive services for you include:  - Medicare offers their members a free annual wellness visit, which is time for you and your primary care provider to discuss and plan for your preventive service needs.  Take advantage of this benefit every year!    -Over the age of 72 should receive the recommended pneumonia vaccines.    -All adults should have a flu vaccine yearly.  -All adults should have a tetanus vaccine every 10 years.   -Over the age 48 should receive the shingles vaccines.        -All adults should be screened once for Hepatitis C.  -All adults age 38-68 who are overweight should have a diabetes screening test once every three years.   -Other screening tests and preventive services for persons with diabetes include: an eye exam to screen for diabetic retinopathy, a kidney function test, a foot exam, and stricter control over your cholesterol.   -Cardiovascular screening for adults with routine risk involves an electrocardiogram (ECG) at intervals determined by your doctor.     -Colorectal cancer screenings should be done for adults age 39-70 with no increased risk factors for colorectal cancer. There are a number of acceptable methods of screening for this type of cancer. Each test has its own benefits and drawbacks. Discuss with your doctor what is most appropriate for you during your annual wellness visit. The different tests include: colonoscopy (considered the best screening method), a fecal occult blood test, a fecal DNA test, and sigmoidoscopy.    -Lung cancer screening is recommended annually with a low dose CT scan for adults between age 54 and 68, who have smoked at least 30 pack years (equivalent of 1 pack per day for 30 days), and who is a current smoker or quit less than 15 years ago.    -A bone mass density test is recommended when a woman turns 65 to screen for osteoporosis. This test is only recommended one time, as a screening. Some providers will use this same test as a disease monitoring tool if you already have osteoporosis. -Breast cancer screenings are recommended every other year for women of normal risk, age 54-69.    -Cervical cancer screenings for women over age 72 are only recommended with certain risk factors.      Here is a list of your current Health Maintenance items (your personalized list of preventive services) with a due date:  Health Maintenance Due   Topic Date Due    Hepatitis C Test  Never done    COVID-19 Vaccine (5 - Booster for Edwena Dollar series) 08/10/2022    Annual Well Visit  02/17/2023

## 2023-03-28 ENCOUNTER — OFFICE VISIT (OUTPATIENT)
Dept: INTERNAL MEDICINE CLINIC | Age: 78
End: 2023-03-28
Payer: MEDICARE

## 2023-03-28 VITALS
WEIGHT: 191 LBS | HEIGHT: 65 IN | BODY MASS INDEX: 31.82 KG/M2 | SYSTOLIC BLOOD PRESSURE: 119 MMHG | RESPIRATION RATE: 14 BRPM | TEMPERATURE: 97.6 F | DIASTOLIC BLOOD PRESSURE: 73 MMHG | OXYGEN SATURATION: 96 % | HEART RATE: 80 BPM

## 2023-03-28 DIAGNOSIS — E78.5 HYPERLIPIDEMIA LDL GOAL <130: ICD-10-CM

## 2023-03-28 DIAGNOSIS — R05.3 CHRONIC COUGH: ICD-10-CM

## 2023-03-28 DIAGNOSIS — G47.9 SLEEP DISORDER: ICD-10-CM

## 2023-03-28 DIAGNOSIS — K21.9 GASTROESOPHAGEAL REFLUX DISEASE WITHOUT ESOPHAGITIS: ICD-10-CM

## 2023-03-28 DIAGNOSIS — M79.605 PAIN IN BOTH LOWER EXTREMITIES: ICD-10-CM

## 2023-03-28 DIAGNOSIS — R73.03 PREDIABETES: ICD-10-CM

## 2023-03-28 DIAGNOSIS — K31.84 GASTROPARESIS: ICD-10-CM

## 2023-03-28 DIAGNOSIS — M79.604 PAIN IN BOTH LOWER EXTREMITIES: ICD-10-CM

## 2023-03-28 DIAGNOSIS — Z00.00 MEDICARE ANNUAL WELLNESS VISIT, SUBSEQUENT: Primary | ICD-10-CM

## 2023-03-28 DIAGNOSIS — I10 ESSENTIAL HYPERTENSION: ICD-10-CM

## 2023-03-28 PROCEDURE — G8510 SCR DEP NEG, NO PLAN REQD: HCPCS | Performed by: INTERNAL MEDICINE

## 2023-03-28 PROCEDURE — G8427 DOCREV CUR MEDS BY ELIG CLIN: HCPCS | Performed by: INTERNAL MEDICINE

## 2023-03-28 PROCEDURE — G0439 PPPS, SUBSEQ VISIT: HCPCS | Performed by: INTERNAL MEDICINE

## 2023-03-28 PROCEDURE — 1101F PT FALLS ASSESS-DOCD LE1/YR: CPT | Performed by: INTERNAL MEDICINE

## 2023-03-28 PROCEDURE — G0463 HOSPITAL OUTPT CLINIC VISIT: HCPCS | Performed by: INTERNAL MEDICINE

## 2023-03-28 PROCEDURE — G8417 CALC BMI ABV UP PARAM F/U: HCPCS | Performed by: INTERNAL MEDICINE

## 2023-03-28 PROCEDURE — 1090F PRES/ABSN URINE INCON ASSESS: CPT | Performed by: INTERNAL MEDICINE

## 2023-03-28 PROCEDURE — 99214 OFFICE O/P EST MOD 30 MIN: CPT | Performed by: INTERNAL MEDICINE

## 2023-03-28 PROCEDURE — G8536 NO DOC ELDER MAL SCRN: HCPCS | Performed by: INTERNAL MEDICINE

## 2023-03-28 PROCEDURE — G8399 PT W/DXA RESULTS DOCUMENT: HCPCS | Performed by: INTERNAL MEDICINE

## 2023-03-28 RX ORDER — BENZONATATE 200 MG/1
200 CAPSULE ORAL
Qty: 30 CAPSULE | Refills: 3 | Status: SHIPPED | OUTPATIENT
Start: 2023-03-28 | End: 2023-04-07

## 2023-04-05 LAB
ALBUMIN SERPL-MCNC: 4.6 G/DL (ref 3.7–4.7)
ALBUMIN/GLOB SERPL: 1.5 {RATIO} (ref 1.2–2.2)
ALP SERPL-CCNC: 94 IU/L (ref 44–121)
ALT SERPL-CCNC: 18 IU/L (ref 0–32)
AST SERPL-CCNC: 26 IU/L (ref 0–40)
BILIRUB SERPL-MCNC: 0.6 MG/DL (ref 0–1.2)
BUN SERPL-MCNC: 27 MG/DL (ref 8–27)
BUN/CREAT SERPL: 24 (ref 12–28)
CALCIUM SERPL-MCNC: 10 MG/DL (ref 8.7–10.3)
CHLORIDE SERPL-SCNC: 98 MMOL/L (ref 96–106)
CHOLEST SERPL-MCNC: 149 MG/DL (ref 100–199)
CO2 SERPL-SCNC: 23 MMOL/L (ref 20–29)
CREAT SERPL-MCNC: 1.13 MG/DL (ref 0.57–1)
EGFRCR SERPLBLD CKD-EPI 2021: 50 ML/MIN/1.73
ERYTHROCYTE [DISTWIDTH] IN BLOOD BY AUTOMATED COUNT: 12.7 % (ref 11.7–15.4)
EST. AVERAGE GLUCOSE BLD GHB EST-MCNC: 126 MG/DL
GLOBULIN SER CALC-MCNC: 3 G/DL (ref 1.5–4.5)
GLUCOSE SERPL-MCNC: 110 MG/DL (ref 70–99)
HBA1C MFR BLD: 6 % (ref 4.8–5.6)
HCT VFR BLD AUTO: 41.6 % (ref 34–46.6)
HDLC SERPL-MCNC: 66 MG/DL
HGB BLD-MCNC: 14 G/DL (ref 11.1–15.9)
IMP & REVIEW OF LAB RESULTS: NORMAL
LDLC SERPL CALC-MCNC: 65 MG/DL (ref 0–99)
MCH RBC QN AUTO: 31.3 PG (ref 26.6–33)
MCHC RBC AUTO-ENTMCNC: 33.7 G/DL (ref 31.5–35.7)
MCV RBC AUTO: 93 FL (ref 79–97)
PLATELET # BLD AUTO: 233 X10E3/UL (ref 150–450)
POTASSIUM SERPL-SCNC: 4.9 MMOL/L (ref 3.5–5.2)
PROT SERPL-MCNC: 7.6 G/DL (ref 6–8.5)
RBC # BLD AUTO: 4.48 X10E6/UL (ref 3.77–5.28)
REPORT: NORMAL
SODIUM SERPL-SCNC: 136 MMOL/L (ref 134–144)
TRIGL SERPL-MCNC: 100 MG/DL (ref 0–149)
VLDLC SERPL CALC-MCNC: 18 MG/DL (ref 5–40)
WBC # BLD AUTO: 7.8 X10E3/UL (ref 3.4–10.8)

## 2023-05-01 ENCOUNTER — TELEPHONE (OUTPATIENT)
Dept: INTERNAL MEDICINE CLINIC | Age: 78
End: 2023-05-01

## 2023-05-01 NOTE — TELEPHONE ENCOUNTER
Patient would like a call. She doesn't know why she is scheduled for a medicare annual wellness visit on August 15th, when she just saw Dr. Bridgette Franz for a regular visit a few weeks ago.      Thank you,

## 2023-05-16 DIAGNOSIS — G47.9 SLEEP DISORDER: Primary | ICD-10-CM

## 2023-05-16 RX ORDER — ALPRAZOLAM 0.25 MG/1
TABLET ORAL
Qty: 60 TABLET | Refills: 2 | Status: SHIPPED | OUTPATIENT
Start: 2023-05-16 | End: 2023-07-15

## 2023-10-08 NOTE — PROGRESS NOTES
risk analysis - 67 y.o. female LDL goal is under 130. ROS: taking medications as instructed, no medication side effects noted, no TIA's, no chest pain on exertion, no dyspnea on exertion, no swelling of ankles. Tolerating meds, New concerns: she has leg pain last time and we discussed crestor being cause           Lab Results   Component Value Date    CHOL 149 04/04/2023     Lab Results   Component Value Date    TRIG 100 04/04/2023     Lab Results   Component Value Date    HDL 66 04/04/2023     Lab Results   Component Value Date    LDLCALC 65 04/04/2023     Lab Results   Component Value Date    LABVLDL 29 05/19/2020    VLDL 18 04/04/2023     No results found for: \"CHOLHDLRATIO\"    Review of Systems   All other systems reviewed and are negative. Objective   Physical Exam  Vitals and nursing note reviewed. Constitutional:       Appearance: Normal appearance. HENT:      Head: Normocephalic and atraumatic. Right Ear: Tympanic membrane and external ear normal.      Left Ear: Tympanic membrane and external ear normal.      Nose: Nose normal.      Mouth/Throat:      Mouth: Mucous membranes are moist.   Eyes:      Extraocular Movements: Extraocular movements intact. Conjunctiva/sclera: Conjunctivae normal.   Cardiovascular:      Rate and Rhythm: Normal rate and regular rhythm. Pulmonary:      Effort: Pulmonary effort is normal.      Breath sounds: Normal breath sounds. Abdominal:      General: Bowel sounds are normal.      Palpations: Abdomen is soft. Musculoskeletal:         General: Normal range of motion. Cervical back: Normal range of motion. Skin:     General: Skin is warm. Neurological:      General: No focal deficit present. Mental Status: She is alert and oriented to person, place, and time. Mental status is at baseline. Psychiatric:         Mood and Affect: Mood normal.         Behavior: Behavior normal.         Thought Content:  Thought content normal.         Judgment:

## 2023-10-09 ENCOUNTER — OFFICE VISIT (OUTPATIENT)
Age: 78
End: 2023-10-09
Payer: MEDICARE

## 2023-10-09 VITALS
SYSTOLIC BLOOD PRESSURE: 116 MMHG | RESPIRATION RATE: 16 BRPM | HEIGHT: 65 IN | OXYGEN SATURATION: 93 % | WEIGHT: 188 LBS | BODY MASS INDEX: 31.32 KG/M2 | HEART RATE: 76 BPM | DIASTOLIC BLOOD PRESSURE: 75 MMHG | TEMPERATURE: 97.4 F

## 2023-10-09 DIAGNOSIS — R05.3 CHRONIC COUGH: ICD-10-CM

## 2023-10-09 DIAGNOSIS — E78.5 HYPERLIPIDEMIA LDL GOAL <100: ICD-10-CM

## 2023-10-09 DIAGNOSIS — I10 ESSENTIAL (PRIMARY) HYPERTENSION: ICD-10-CM

## 2023-10-09 DIAGNOSIS — R73.03 PREDIABETES: ICD-10-CM

## 2023-10-09 DIAGNOSIS — K31.84 GASTROPARESIS: Primary | ICD-10-CM

## 2023-10-09 DIAGNOSIS — G47.9 SLEEP DISORDER, UNSPECIFIED: ICD-10-CM

## 2023-10-09 DIAGNOSIS — K21.9 GASTRO-ESOPHAGEAL REFLUX DISEASE WITHOUT ESOPHAGITIS: ICD-10-CM

## 2023-10-09 PROCEDURE — 3074F SYST BP LT 130 MM HG: CPT | Performed by: INTERNAL MEDICINE

## 2023-10-09 PROCEDURE — 1123F ACP DISCUSS/DSCN MKR DOCD: CPT | Performed by: INTERNAL MEDICINE

## 2023-10-09 PROCEDURE — 3078F DIAST BP <80 MM HG: CPT | Performed by: INTERNAL MEDICINE

## 2023-10-09 PROCEDURE — 99214 OFFICE O/P EST MOD 30 MIN: CPT | Performed by: INTERNAL MEDICINE

## 2023-10-09 RX ORDER — OLMESARTAN MEDOXOMIL 20 MG/1
20 TABLET ORAL DAILY
Qty: 90 TABLET | Refills: 1 | Status: SHIPPED | OUTPATIENT
Start: 2023-10-09

## 2023-10-09 SDOH — ECONOMIC STABILITY: FOOD INSECURITY: WITHIN THE PAST 12 MONTHS, THE FOOD YOU BOUGHT JUST DIDN'T LAST AND YOU DIDN'T HAVE MONEY TO GET MORE.: NEVER TRUE

## 2023-10-09 SDOH — ECONOMIC STABILITY: FOOD INSECURITY: WITHIN THE PAST 12 MONTHS, YOU WORRIED THAT YOUR FOOD WOULD RUN OUT BEFORE YOU GOT MONEY TO BUY MORE.: NEVER TRUE

## 2023-10-09 SDOH — ECONOMIC STABILITY: HOUSING INSECURITY
IN THE LAST 12 MONTHS, WAS THERE A TIME WHEN YOU DID NOT HAVE A STEADY PLACE TO SLEEP OR SLEPT IN A SHELTER (INCLUDING NOW)?: NO

## 2023-10-09 SDOH — ECONOMIC STABILITY: INCOME INSECURITY: HOW HARD IS IT FOR YOU TO PAY FOR THE VERY BASICS LIKE FOOD, HOUSING, MEDICAL CARE, AND HEATING?: NOT HARD AT ALL

## 2023-10-09 ASSESSMENT — PATIENT HEALTH QUESTIONNAIRE - PHQ9
SUM OF ALL RESPONSES TO PHQ QUESTIONS 1-9: 0
SUM OF ALL RESPONSES TO PHQ9 QUESTIONS 1 & 2: 0
1. LITTLE INTEREST OR PLEASURE IN DOING THINGS: 0
2. FEELING DOWN, DEPRESSED OR HOPELESS: 0
SUM OF ALL RESPONSES TO PHQ QUESTIONS 1-9: 0

## 2023-10-20 LAB
ALBUMIN SERPL-MCNC: 4.5 G/DL (ref 3.8–4.8)
ALBUMIN/GLOB SERPL: 1.6 {RATIO} (ref 1.2–2.2)
ALP SERPL-CCNC: 114 IU/L (ref 44–121)
ALT SERPL-CCNC: 17 IU/L (ref 0–32)
AST SERPL-CCNC: 20 IU/L (ref 0–40)
BILIRUB SERPL-MCNC: 0.5 MG/DL (ref 0–1.2)
BUN SERPL-MCNC: 30 MG/DL (ref 8–27)
BUN/CREAT SERPL: 27 (ref 12–28)
CALCIUM SERPL-MCNC: 9.8 MG/DL (ref 8.7–10.3)
CHLORIDE SERPL-SCNC: 100 MMOL/L (ref 96–106)
CHOLEST SERPL-MCNC: 160 MG/DL (ref 100–199)
CO2 SERPL-SCNC: 22 MMOL/L (ref 20–29)
CREAT SERPL-MCNC: 1.12 MG/DL (ref 0.57–1)
EGFRCR SERPLBLD CKD-EPI 2021: 50 ML/MIN/1.73
GLOBULIN SER CALC-MCNC: 2.8 G/DL (ref 1.5–4.5)
GLUCOSE SERPL-MCNC: 116 MG/DL (ref 70–99)
HBA1C MFR BLD: 6 % (ref 4.8–5.6)
HDLC SERPL-MCNC: 67 MG/DL
IMP & REVIEW OF LAB RESULTS: NORMAL
LDLC SERPL CALC-MCNC: 74 MG/DL (ref 0–99)
POTASSIUM SERPL-SCNC: 4.8 MMOL/L (ref 3.5–5.2)
PROT SERPL-MCNC: 7.3 G/DL (ref 6–8.5)
REPORT: NORMAL
SODIUM SERPL-SCNC: 140 MMOL/L (ref 134–144)
TRIGL SERPL-MCNC: 105 MG/DL (ref 0–149)
VLDLC SERPL CALC-MCNC: 19 MG/DL (ref 5–40)

## 2024-01-03 DIAGNOSIS — G47.9 SLEEP DISORDER: ICD-10-CM

## 2024-01-03 RX ORDER — ALPRAZOLAM 0.25 MG/1
TABLET ORAL
Qty: 60 TABLET | Refills: 0 | Status: SHIPPED | OUTPATIENT
Start: 2024-01-03 | End: 2024-03-03

## 2024-03-24 DIAGNOSIS — G47.9 SLEEP DISORDER: ICD-10-CM

## 2024-03-26 RX ORDER — ALPRAZOLAM 0.25 MG/1
TABLET ORAL
Qty: 60 TABLET | Refills: 0 | Status: SHIPPED | OUTPATIENT
Start: 2024-03-26 | End: 2024-05-25

## 2024-04-18 RX ORDER — ROSUVASTATIN CALCIUM 5 MG/1
TABLET, COATED ORAL
Qty: 90 TABLET | Refills: 1 | Status: SHIPPED | OUTPATIENT
Start: 2024-04-18

## 2024-04-22 ENCOUNTER — OFFICE VISIT (OUTPATIENT)
Age: 79
End: 2024-04-22
Payer: MEDICARE

## 2024-04-22 VITALS
BODY MASS INDEX: 31.99 KG/M2 | OXYGEN SATURATION: 98 % | SYSTOLIC BLOOD PRESSURE: 126 MMHG | HEIGHT: 65 IN | DIASTOLIC BLOOD PRESSURE: 76 MMHG | HEART RATE: 79 BPM | WEIGHT: 192 LBS | RESPIRATION RATE: 16 BRPM

## 2024-04-22 DIAGNOSIS — K21.9 GASTRO-ESOPHAGEAL REFLUX DISEASE WITHOUT ESOPHAGITIS: ICD-10-CM

## 2024-04-22 DIAGNOSIS — R73.03 PREDIABETES: ICD-10-CM

## 2024-04-22 DIAGNOSIS — I10 ESSENTIAL (PRIMARY) HYPERTENSION: ICD-10-CM

## 2024-04-22 DIAGNOSIS — E78.5 HYPERLIPIDEMIA LDL GOAL <100: ICD-10-CM

## 2024-04-22 DIAGNOSIS — G47.9 SLEEP DISORDER: ICD-10-CM

## 2024-04-22 DIAGNOSIS — K31.84 GASTROPARESIS: ICD-10-CM

## 2024-04-22 DIAGNOSIS — R05.3 CHRONIC COUGH: ICD-10-CM

## 2024-04-22 DIAGNOSIS — Z00.00 MEDICARE ANNUAL WELLNESS VISIT, SUBSEQUENT: Primary | ICD-10-CM

## 2024-04-22 PROCEDURE — G8417 CALC BMI ABV UP PARAM F/U: HCPCS | Performed by: INTERNAL MEDICINE

## 2024-04-22 PROCEDURE — 3078F DIAST BP <80 MM HG: CPT | Performed by: INTERNAL MEDICINE

## 2024-04-22 PROCEDURE — 3074F SYST BP LT 130 MM HG: CPT | Performed by: INTERNAL MEDICINE

## 2024-04-22 PROCEDURE — G8428 CUR MEDS NOT DOCUMENT: HCPCS | Performed by: INTERNAL MEDICINE

## 2024-04-22 PROCEDURE — 1123F ACP DISCUSS/DSCN MKR DOCD: CPT | Performed by: INTERNAL MEDICINE

## 2024-04-22 PROCEDURE — G0439 PPPS, SUBSEQ VISIT: HCPCS | Performed by: INTERNAL MEDICINE

## 2024-04-22 PROCEDURE — 99214 OFFICE O/P EST MOD 30 MIN: CPT | Performed by: INTERNAL MEDICINE

## 2024-04-22 PROCEDURE — 1090F PRES/ABSN URINE INCON ASSESS: CPT | Performed by: INTERNAL MEDICINE

## 2024-04-22 PROCEDURE — 1036F TOBACCO NON-USER: CPT | Performed by: INTERNAL MEDICINE

## 2024-04-22 PROCEDURE — G8399 PT W/DXA RESULTS DOCUMENT: HCPCS | Performed by: INTERNAL MEDICINE

## 2024-04-22 ASSESSMENT — LIFESTYLE VARIABLES
HOW MANY STANDARD DRINKS CONTAINING ALCOHOL DO YOU HAVE ON A TYPICAL DAY: 1 OR 2
HOW OFTEN DO YOU HAVE A DRINK CONTAINING ALCOHOL: 2-3 TIMES A WEEK

## 2024-04-22 ASSESSMENT — PATIENT HEALTH QUESTIONNAIRE - PHQ9
SUM OF ALL RESPONSES TO PHQ9 QUESTIONS 1 & 2: 0
SUM OF ALL RESPONSES TO PHQ QUESTIONS 1-9: 0
2. FEELING DOWN, DEPRESSED OR HOPELESS: NOT AT ALL
SUM OF ALL RESPONSES TO PHQ QUESTIONS 1-9: 0
1. LITTLE INTEREST OR PLEASURE IN DOING THINGS: NOT AT ALL
SUM OF ALL RESPONSES TO PHQ QUESTIONS 1-9: 0
SUM OF ALL RESPONSES TO PHQ QUESTIONS 1-9: 0

## 2024-04-22 NOTE — PROGRESS NOTES
\"Have you been to the ER, urgent care clinic since your last visit?  Hospitalized since your last visit?\"    NO    “Have you seen or consulted any other health care providers outside of [facility] since your last visit?”    NO            Click Here for Release of Records Request   
capsule Take 30 mg by mouth 2 times daily Yes Automatic Reconciliation, Ar   erythromycin (ERYPED 200) 200 MG/5ML suspension ceived the following from [facility]: Outside name: ERYPED 200 200 mg/5 mL suspension Yes Automatic Reconciliation, Ar   famotidine (PEPCID) 20 MG tablet Take 1 tablet by mouth Yes Automatic Reconciliation, Ar   pimecrolimus (ELIDEL) 1 % cream APPLY TOPICALLY TO RASH TWICE DAILY Yes Automatic Reconciliation, Ar   valACYclovir (VALTREX) 500 MG tablet Take by mouth 2 times daily Yes Automatic Reconciliation, Ar       CareTeam (Including outside providers/suppliers regularly involved in providing care):   Patient Care Team:  Oneida Thomas MD as PCP - General  Oneida Thomas MD as PCP - Empaneled Provider     Reviewed and updated this visit:  Tobacco  Allergies  Med Hx  Surg Hx  Soc Hx  Fam Hx

## 2024-04-23 DIAGNOSIS — I10 PRIMARY HYPERTENSION: ICD-10-CM

## 2024-04-23 DIAGNOSIS — I10 PRIMARY HYPERTENSION: Primary | ICD-10-CM

## 2024-04-23 LAB
ALBUMIN SERPL-MCNC: 4.1 G/DL (ref 3.5–5)
ALBUMIN/GLOB SERPL: 1.1 (ref 1.1–2.2)
ALP SERPL-CCNC: 110 U/L (ref 45–117)
ALT SERPL-CCNC: 25 U/L (ref 12–78)
ANION GAP SERPL CALC-SCNC: 6 MMOL/L (ref 5–15)
AST SERPL-CCNC: 26 U/L (ref 15–37)
BILIRUB SERPL-MCNC: 0.5 MG/DL (ref 0.2–1)
BUN SERPL-MCNC: 30 MG/DL (ref 6–20)
BUN/CREAT SERPL: 30 (ref 12–20)
CALCIUM SERPL-MCNC: 10.5 MG/DL (ref 8.5–10.1)
CHLORIDE SERPL-SCNC: 103 MMOL/L (ref 97–108)
CHOLEST SERPL-MCNC: 165 MG/DL
CO2 SERPL-SCNC: 25 MMOL/L (ref 21–32)
CREAT SERPL-MCNC: 1 MG/DL (ref 0.55–1.02)
ERYTHROCYTE [DISTWIDTH] IN BLOOD BY AUTOMATED COUNT: 12.9 % (ref 11.5–14.5)
EST. AVERAGE GLUCOSE BLD GHB EST-MCNC: 117 MG/DL
GLOBULIN SER CALC-MCNC: 3.7 G/DL (ref 2–4)
GLUCOSE SERPL-MCNC: 128 MG/DL (ref 65–100)
HBA1C MFR BLD: 5.7 % (ref 4–5.6)
HCT VFR BLD AUTO: 42.6 % (ref 35–47)
HDLC SERPL-MCNC: 64 MG/DL
HDLC SERPL: 2.6 (ref 0–5)
HGB BLD-MCNC: 14.1 G/DL (ref 11.5–16)
LDLC SERPL CALC-MCNC: 79 MG/DL (ref 0–100)
MCH RBC QN AUTO: 31.2 PG (ref 26–34)
MCHC RBC AUTO-ENTMCNC: 33.1 G/DL (ref 30–36.5)
MCV RBC AUTO: 94.2 FL (ref 80–99)
NRBC # BLD: 0 K/UL (ref 0–0.01)
NRBC BLD-RTO: 0 PER 100 WBC
PLATELET # BLD AUTO: 233 K/UL (ref 150–400)
PMV BLD AUTO: 11.2 FL (ref 8.9–12.9)
POTASSIUM SERPL-SCNC: 4.7 MMOL/L (ref 3.5–5.1)
PROT SERPL-MCNC: 7.8 G/DL (ref 6.4–8.2)
RBC # BLD AUTO: 4.52 M/UL (ref 3.8–5.2)
SODIUM SERPL-SCNC: 134 MMOL/L (ref 136–145)
TRIGL SERPL-MCNC: 110 MG/DL
VLDLC SERPL CALC-MCNC: 22 MG/DL
WBC # BLD AUTO: 9.7 K/UL (ref 3.6–11)

## 2024-04-26 DIAGNOSIS — I10 ESSENTIAL (PRIMARY) HYPERTENSION: ICD-10-CM

## 2024-04-26 RX ORDER — OLMESARTAN MEDOXOMIL 20 MG/1
20 TABLET ORAL DAILY
Qty: 90 TABLET | Refills: 2 | Status: SHIPPED | OUTPATIENT
Start: 2024-04-26

## 2024-04-30 LAB
ALBUMIN SERPL-MCNC: 4.5 G/DL (ref 3.8–4.8)
ALBUMIN/GLOB SERPL: 1.6 {RATIO} (ref 1.2–2.2)
ALP SERPL-CCNC: 85 IU/L (ref 44–121)
ALT SERPL-CCNC: 22 IU/L (ref 0–32)
AST SERPL-CCNC: 29 IU/L (ref 0–40)
BILIRUB SERPL-MCNC: 0.6 MG/DL (ref 0–1.2)
BUN SERPL-MCNC: 18 MG/DL (ref 8–27)
BUN/CREAT SERPL: 23 (ref 12–28)
CALCIUM SERPL-MCNC: 9.8 MG/DL (ref 8.7–10.3)
CHLORIDE SERPL-SCNC: 98 MMOL/L (ref 96–106)
CO2 SERPL-SCNC: 22 MMOL/L (ref 20–29)
CREAT SERPL-MCNC: 0.79 MG/DL (ref 0.57–1)
EGFRCR SERPLBLD CKD-EPI 2021: 76 ML/MIN/1.73
GLOBULIN SER CALC-MCNC: 2.8 G/DL (ref 1.5–4.5)
GLUCOSE SERPL-MCNC: 106 MG/DL (ref 70–99)
POTASSIUM SERPL-SCNC: 4.9 MMOL/L (ref 3.5–5.2)
PROT SERPL-MCNC: 7.3 G/DL (ref 6–8.5)
SODIUM SERPL-SCNC: 137 MMOL/L (ref 134–144)

## 2024-05-01 DIAGNOSIS — R05.3 CHRONIC COUGH: ICD-10-CM

## 2024-05-01 PROCEDURE — 71046 X-RAY EXAM CHEST 2 VIEWS: CPT | Performed by: INTERNAL MEDICINE

## 2024-06-04 ENCOUNTER — OFFICE VISIT (OUTPATIENT)
Age: 79
End: 2024-06-04
Payer: MEDICARE

## 2024-06-04 VITALS
TEMPERATURE: 98.2 F | DIASTOLIC BLOOD PRESSURE: 76 MMHG | HEART RATE: 73 BPM | BODY MASS INDEX: 32.49 KG/M2 | HEIGHT: 65 IN | SYSTOLIC BLOOD PRESSURE: 116 MMHG | WEIGHT: 195 LBS | OXYGEN SATURATION: 98 % | RESPIRATION RATE: 16 BRPM

## 2024-06-04 DIAGNOSIS — K31.84 GASTROPARESIS: ICD-10-CM

## 2024-06-04 DIAGNOSIS — K21.9 GASTRO-ESOPHAGEAL REFLUX DISEASE WITHOUT ESOPHAGITIS: ICD-10-CM

## 2024-06-04 DIAGNOSIS — R60.9 EDEMA, UNSPECIFIED TYPE: ICD-10-CM

## 2024-06-04 DIAGNOSIS — G47.9 SLEEP DISORDER: ICD-10-CM

## 2024-06-04 DIAGNOSIS — K29.80 DUODENITIS: Primary | ICD-10-CM

## 2024-06-04 DIAGNOSIS — I10 ESSENTIAL (PRIMARY) HYPERTENSION: ICD-10-CM

## 2024-06-04 DIAGNOSIS — E78.5 HYPERLIPIDEMIA LDL GOAL <100: ICD-10-CM

## 2024-06-04 DIAGNOSIS — R73.03 PREDIABETES: ICD-10-CM

## 2024-06-04 PROCEDURE — 1036F TOBACCO NON-USER: CPT | Performed by: INTERNAL MEDICINE

## 2024-06-04 PROCEDURE — 3074F SYST BP LT 130 MM HG: CPT | Performed by: INTERNAL MEDICINE

## 2024-06-04 PROCEDURE — 99215 OFFICE O/P EST HI 40 MIN: CPT | Performed by: INTERNAL MEDICINE

## 2024-06-04 PROCEDURE — 1090F PRES/ABSN URINE INCON ASSESS: CPT | Performed by: INTERNAL MEDICINE

## 2024-06-04 PROCEDURE — 1123F ACP DISCUSS/DSCN MKR DOCD: CPT | Performed by: INTERNAL MEDICINE

## 2024-06-04 PROCEDURE — 3078F DIAST BP <80 MM HG: CPT | Performed by: INTERNAL MEDICINE

## 2024-06-04 PROCEDURE — G8417 CALC BMI ABV UP PARAM F/U: HCPCS | Performed by: INTERNAL MEDICINE

## 2024-06-04 PROCEDURE — G8399 PT W/DXA RESULTS DOCUMENT: HCPCS | Performed by: INTERNAL MEDICINE

## 2024-06-04 PROCEDURE — G8428 CUR MEDS NOT DOCUMENT: HCPCS | Performed by: INTERNAL MEDICINE

## 2024-06-04 RX ORDER — CIPROFLOXACIN 250 MG/1
250 TABLET, FILM COATED ORAL 2 TIMES DAILY
COMMUNITY

## 2024-06-04 RX ORDER — FUROSEMIDE 20 MG/1
20 TABLET ORAL DAILY
Qty: 7 TABLET | Refills: 0 | Status: SHIPPED | OUTPATIENT
Start: 2024-06-04

## 2024-06-04 RX ORDER — METRONIDAZOLE 500 MG/1
TABLET ORAL
COMMUNITY
Start: 2024-06-03

## 2024-06-04 NOTE — PROGRESS NOTES
Flory Graff (:  1945) is a 78 y.o. female,Established patient, here for evaluation of the following chief complaint(s):  No chief complaint on file.      Assessment & Plan   1. Duodenitis  2. Gastroparesis  3. Essential (primary) hypertension  4. Hyperlipidemia LDL goal <100  5. Sleep disorder  6. Prediabetes  7. Gastro-esophageal reflux disease without esophagitis    Doing?  ?doing   Benicar  Crestor  Xanax  Eating right   Nexium ?   ? Doing   No follow-ups on file.     Assessment & Plan  1. Duodenitis.  She is to finish her antibiotics that were prescribed which are Flagyl and cefpodoxime.  She does have a follow-up with GI and her abdominal exam was normal today    2.  Weight gain I suspect this is from all the IV fluids and the fluid she received through her antibiotics I gave her prescription for Lasix to take every couple days to help with the water retention    3.  Gastroparesis continue with the same dietary changes that she has been doing    4.  Hypertension continue with olmesartan 20 mg once a day and to take that at bedtime    5.  Hyperlipidemia goal less than 100 continue with rosuvastatin last LFTs and lipids were normal    6.  Sleep disorder continue with Xanax at night and    7.  GERD continue with Dexilant she is going to have to space out her antibiotics with her antacid medicines    Follow-up  A follow-up appointment is scheduled for 1 month from now.     Subjective   HPI  Patient was admitted on May 31 for evaluation of duodenitis because she had presented to us with [facility] ED with abdominal pain.  Abdominal CT showed prominent inflammation of the transverse duodenum and adjacent edema.  CRP was elevated she underwent an endoscopy on [place] 3 that showed no ulcer or inflammation within the duodenum.  They told her to complete a course of antibiotics empirically.  And to follow-up with GI as an outpatient and repeat imaging.  While in the hospital she was treated with broad-spectrum

## 2024-08-19 DIAGNOSIS — G47.9 SLEEP DISORDER: ICD-10-CM

## 2024-08-19 RX ORDER — ALPRAZOLAM 0.25 MG/1
TABLET ORAL
Qty: 60 TABLET | Refills: 0 | Status: SHIPPED | OUTPATIENT
Start: 2024-08-19 | End: 2024-10-18

## 2024-08-26 ENCOUNTER — OFFICE VISIT (OUTPATIENT)
Age: 79
End: 2024-08-26
Payer: MEDICARE

## 2024-08-26 VITALS
BODY MASS INDEX: 30.35 KG/M2 | SYSTOLIC BLOOD PRESSURE: 124 MMHG | HEART RATE: 83 BPM | DIASTOLIC BLOOD PRESSURE: 77 MMHG | OXYGEN SATURATION: 97 % | HEIGHT: 65 IN | WEIGHT: 182.2 LBS | RESPIRATION RATE: 14 BRPM

## 2024-08-26 DIAGNOSIS — B37.2 CUTANEOUS CANDIDIASIS: Primary | ICD-10-CM

## 2024-08-26 PROCEDURE — 3078F DIAST BP <80 MM HG: CPT | Performed by: INTERNAL MEDICINE

## 2024-08-26 PROCEDURE — 3074F SYST BP LT 130 MM HG: CPT | Performed by: INTERNAL MEDICINE

## 2024-08-26 PROCEDURE — G8417 CALC BMI ABV UP PARAM F/U: HCPCS | Performed by: INTERNAL MEDICINE

## 2024-08-26 PROCEDURE — 1090F PRES/ABSN URINE INCON ASSESS: CPT | Performed by: INTERNAL MEDICINE

## 2024-08-26 PROCEDURE — 1123F ACP DISCUSS/DSCN MKR DOCD: CPT | Performed by: INTERNAL MEDICINE

## 2024-08-26 PROCEDURE — 99213 OFFICE O/P EST LOW 20 MIN: CPT | Performed by: INTERNAL MEDICINE

## 2024-08-26 PROCEDURE — G8399 PT W/DXA RESULTS DOCUMENT: HCPCS | Performed by: INTERNAL MEDICINE

## 2024-08-26 PROCEDURE — 1036F TOBACCO NON-USER: CPT | Performed by: INTERNAL MEDICINE

## 2024-08-26 PROCEDURE — G8427 DOCREV CUR MEDS BY ELIG CLIN: HCPCS | Performed by: INTERNAL MEDICINE

## 2024-08-26 RX ORDER — NYSTATIN 100000 [USP'U]/G
POWDER TOPICAL
Qty: 60 G | Refills: 0 | Status: SHIPPED | OUTPATIENT
Start: 2024-08-26

## 2024-08-26 ASSESSMENT — PATIENT HEALTH QUESTIONNAIRE - PHQ9
SUM OF ALL RESPONSES TO PHQ QUESTIONS 1-9: 0
SUM OF ALL RESPONSES TO PHQ QUESTIONS 1-9: 0
2. FEELING DOWN, DEPRESSED OR HOPELESS: NOT AT ALL
1. LITTLE INTEREST OR PLEASURE IN DOING THINGS: NOT AT ALL
SUM OF ALL RESPONSES TO PHQ QUESTIONS 1-9: 0
SUM OF ALL RESPONSES TO PHQ QUESTIONS 1-9: 0
SUM OF ALL RESPONSES TO PHQ9 QUESTIONS 1 & 2: 0

## 2024-08-26 NOTE — PROGRESS NOTES
A/P:    1. Cutaneous candidiasis  Will treat with nystatin powder and have her keep the area as dry as possible, follow up if not resolving.  - nystatin (MYCOSTATIN) 993684 UNIT/GM powder; Apply 2 times daily.  Dispense: 60 g; Refill: 0         Follow up:  prn         An electronic signature was used to authenticate this note.    --Marimar Monet MD         HPI: Flory Graff is here for an acute visit.        Rash: Ms. Graff has had a rash under her breasts for a couple weeks. The area feels tender, and is itching. She took Benadryl last night. She has tried hydrocortisone OTC and cornstarch and Vaseline.       Current Outpatient Medications:     ALPRAZolam (XANAX) 0.25 MG tablet, TAKE 1 TABLET BY MOUTH TWICE DAILY AS NEEDED, Disp: 60 tablet, Rfl: 0    furosemide (LASIX) 20 MG tablet, Take 1 tablet by mouth daily, Disp: 7 tablet, Rfl: 0    olmesartan (BENICAR) 20 MG tablet, TAKE 1 TABLET BY MOUTH DAILY, Disp: 90 tablet, Rfl: 2    rosuvastatin (CRESTOR) 5 MG tablet, TAKE 1 TABLET BY MOUTH EVERY NIGHT, Disp: 90 tablet, Rfl: 1    dexlansoprazole (DEXILANT) 60 MG CPDR delayed release capsule, Take 30 mg by mouth 2 times daily, Disp: , Rfl:     erythromycin (ERYPED 200) 200 MG/5ML suspension, ceived the following from [facility]: Outside name: ERYPED 200 200 mg/5 mL suspension, Disp: , Rfl:     famotidine (PEPCID) 20 MG tablet, Take 1 tablet by mouth, Disp: , Rfl:     pimecrolimus (ELIDEL) 1 % cream, APPLY TOPICALLY TO RASH TWICE DAILY, Disp: , Rfl:     valACYclovir (VALTREX) 500 MG tablet, Take by mouth 2 times daily, Disp: , Rfl:       Allergies   Allergen Reactions    Ciprofloxacin Swelling    Penicillins Rash        /77 (Site: Left Upper Arm, Position: Sitting, Cuff Size: Small Adult)   Pulse 83   Resp 14   Ht 1.651 m (5' 5\")   Wt 82.6 kg (182 lb 3.2 oz)   SpO2 97%   BMI 30.32 kg/m²      General: well nourished, well appearing, in no distress  Skin: pink rash under both breasts,

## 2024-09-12 ENCOUNTER — OFFICE VISIT (OUTPATIENT)
Age: 79
End: 2024-09-12
Payer: MEDICARE

## 2024-09-12 VITALS
SYSTOLIC BLOOD PRESSURE: 101 MMHG | RESPIRATION RATE: 16 BRPM | WEIGHT: 181.6 LBS | BODY MASS INDEX: 30.26 KG/M2 | TEMPERATURE: 98.6 F | HEART RATE: 84 BPM | HEIGHT: 65 IN | DIASTOLIC BLOOD PRESSURE: 67 MMHG

## 2024-09-12 DIAGNOSIS — E78.5 HYPERLIPIDEMIA LDL GOAL <100: ICD-10-CM

## 2024-09-12 DIAGNOSIS — I10 ESSENTIAL (PRIMARY) HYPERTENSION: ICD-10-CM

## 2024-09-12 DIAGNOSIS — R21 RASH: ICD-10-CM

## 2024-09-12 DIAGNOSIS — R73.03 PREDIABETES: ICD-10-CM

## 2024-09-12 DIAGNOSIS — K21.9 GASTRO-ESOPHAGEAL REFLUX DISEASE WITHOUT ESOPHAGITIS: ICD-10-CM

## 2024-09-12 DIAGNOSIS — K31.84 GASTROPARESIS: Primary | ICD-10-CM

## 2024-09-12 DIAGNOSIS — G47.9 SLEEP DISORDER: ICD-10-CM

## 2024-09-12 PROCEDURE — 3074F SYST BP LT 130 MM HG: CPT | Performed by: INTERNAL MEDICINE

## 2024-09-12 PROCEDURE — G8417 CALC BMI ABV UP PARAM F/U: HCPCS | Performed by: INTERNAL MEDICINE

## 2024-09-12 PROCEDURE — 3078F DIAST BP <80 MM HG: CPT | Performed by: INTERNAL MEDICINE

## 2024-09-12 PROCEDURE — 1123F ACP DISCUSS/DSCN MKR DOCD: CPT | Performed by: INTERNAL MEDICINE

## 2024-09-12 PROCEDURE — G8399 PT W/DXA RESULTS DOCUMENT: HCPCS | Performed by: INTERNAL MEDICINE

## 2024-09-12 PROCEDURE — 1036F TOBACCO NON-USER: CPT | Performed by: INTERNAL MEDICINE

## 2024-09-12 PROCEDURE — G8428 CUR MEDS NOT DOCUMENT: HCPCS | Performed by: INTERNAL MEDICINE

## 2024-09-12 PROCEDURE — 99214 OFFICE O/P EST MOD 30 MIN: CPT | Performed by: INTERNAL MEDICINE

## 2024-09-12 PROCEDURE — 1090F PRES/ABSN URINE INCON ASSESS: CPT | Performed by: INTERNAL MEDICINE

## 2024-09-12 RX ORDER — FLUCONAZOLE 200 MG/1
200 TABLET ORAL DAILY
Qty: 5 TABLET | Refills: 0 | Status: SHIPPED | OUTPATIENT
Start: 2024-09-12 | End: 2024-09-17

## 2024-09-19 ENCOUNTER — PATIENT MESSAGE (OUTPATIENT)
Age: 79
End: 2024-09-19

## 2024-09-19 RX ORDER — KETOCONAZOLE 20 MG/G
CREAM TOPICAL
Qty: 30 G | Refills: 5 | Status: SHIPPED | OUTPATIENT
Start: 2024-09-19

## 2024-10-27 NOTE — PROGRESS NOTES
Flory Graff (:  1945) is a 79 y.o. female,Established patient, here for evaluation of the following chief complaint(s):  Hypertension (Patient is not fasting)     Diagnosis Orders   1. Gastroparesis        2. Essential (primary) hypertension  Comprehensive Metabolic Panel      3. Hyperlipidemia LDL goal <100  Lipid Panel      4. Sleep disorder        5. Prediabetes  Hemoglobin A1C      6. Gastro-esophageal reflux disease without esophagitis         ?doing   Benicar  Crestor  Xanax  Eating right   Dexilant, pepcid as needed            Assessment & Plan  1. Hypertension.  Her blood pressure is well-controlled at 122/79. She is currently taking olmesartan (Benicar). No changes to her medication regimen are needed at this time.    2. Gastroparesis.  She is managing her symptoms by avoiding certain foods and consuming cooked vegetables and soups. This dietary approach is effective for her.    3. Hyperlipidemia.  She is tolerating Crestor well without any leg cramps or Charley horses. No changes to her medication regimen are needed at this time.    4. Insomnia.  She occasionally takes alprazolam at night to help with sleep but does not use it every night. She reports feeling tired and not sleeping well, waking up frequently.    5. Gastroesophageal Reflux Disease (GERD).  She continues to take Dexilant and sometimes Pepcid at night if needed. She avoids eating after 6:00 PM to manage her symptoms. She reports a persistent cough but no sore throat in the morning.    6. Mycotic infection.  The ketoconazole 2% cream has been effective in treating her yeast infection. She should continue using this cream as needed.    7. Health Maintenance.  Blood work, including an A1c test, will be conducted today due to her history of prediabetes. She plans to receive both the influenza and COVID-19 vaccines at [facility] and will message once completed.         Subjective   HPI  History of Present Illness  The patient presents

## 2024-10-29 ENCOUNTER — OFFICE VISIT (OUTPATIENT)
Age: 79
End: 2024-10-29
Payer: MEDICARE

## 2024-10-29 VITALS
DIASTOLIC BLOOD PRESSURE: 79 MMHG | RESPIRATION RATE: 16 BRPM | OXYGEN SATURATION: 98 % | SYSTOLIC BLOOD PRESSURE: 122 MMHG | BODY MASS INDEX: 30.52 KG/M2 | HEIGHT: 65 IN | TEMPERATURE: 97.4 F | HEART RATE: 71 BPM | WEIGHT: 183.2 LBS

## 2024-10-29 DIAGNOSIS — I10 ESSENTIAL (PRIMARY) HYPERTENSION: ICD-10-CM

## 2024-10-29 DIAGNOSIS — R73.03 PREDIABETES: ICD-10-CM

## 2024-10-29 DIAGNOSIS — G47.9 SLEEP DISORDER: ICD-10-CM

## 2024-10-29 DIAGNOSIS — E78.5 HYPERLIPIDEMIA LDL GOAL <100: ICD-10-CM

## 2024-10-29 DIAGNOSIS — K21.9 GASTRO-ESOPHAGEAL REFLUX DISEASE WITHOUT ESOPHAGITIS: ICD-10-CM

## 2024-10-29 DIAGNOSIS — K31.84 GASTROPARESIS: Primary | ICD-10-CM

## 2024-10-29 PROCEDURE — 99214 OFFICE O/P EST MOD 30 MIN: CPT | Performed by: INTERNAL MEDICINE

## 2024-10-29 SDOH — ECONOMIC STABILITY: FOOD INSECURITY: WITHIN THE PAST 12 MONTHS, THE FOOD YOU BOUGHT JUST DIDN'T LAST AND YOU DIDN'T HAVE MONEY TO GET MORE.: NEVER TRUE

## 2024-10-29 SDOH — ECONOMIC STABILITY: FOOD INSECURITY: WITHIN THE PAST 12 MONTHS, YOU WORRIED THAT YOUR FOOD WOULD RUN OUT BEFORE YOU GOT MONEY TO BUY MORE.: NEVER TRUE

## 2024-10-29 SDOH — ECONOMIC STABILITY: INCOME INSECURITY: HOW HARD IS IT FOR YOU TO PAY FOR THE VERY BASICS LIKE FOOD, HOUSING, MEDICAL CARE, AND HEATING?: NOT HARD AT ALL

## 2024-10-30 DIAGNOSIS — E78.5 HYPERLIPIDEMIA LDL GOAL <100: Primary | ICD-10-CM

## 2024-10-30 LAB
ALBUMIN SERPL-MCNC: 4.3 G/DL (ref 3.5–5)
ALBUMIN/GLOB SERPL: 1.2 (ref 1.1–2.2)
ALP SERPL-CCNC: 100 U/L (ref 45–117)
ALT SERPL-CCNC: 27 U/L (ref 12–78)
ANION GAP SERPL CALC-SCNC: 5 MMOL/L (ref 2–12)
AST SERPL-CCNC: 23 U/L (ref 15–37)
BILIRUB SERPL-MCNC: 0.9 MG/DL (ref 0.2–1)
BUN SERPL-MCNC: 23 MG/DL (ref 6–20)
BUN/CREAT SERPL: 23 (ref 12–20)
CALCIUM SERPL-MCNC: 9.7 MG/DL (ref 8.5–10.1)
CHLORIDE SERPL-SCNC: 103 MMOL/L (ref 97–108)
CHOLEST SERPL-MCNC: 184 MG/DL
CO2 SERPL-SCNC: 29 MMOL/L (ref 21–32)
CREAT SERPL-MCNC: 0.99 MG/DL (ref 0.55–1.02)
EST. AVERAGE GLUCOSE BLD GHB EST-MCNC: 120 MG/DL
GLOBULIN SER CALC-MCNC: 3.5 G/DL (ref 2–4)
GLUCOSE SERPL-MCNC: 100 MG/DL (ref 65–100)
HBA1C MFR BLD: 5.8 % (ref 4–5.6)
HDLC SERPL-MCNC: 71 MG/DL
HDLC SERPL: 2.6 (ref 0–5)
LDLC SERPL CALC-MCNC: 79.8 MG/DL (ref 0–100)
POTASSIUM SERPL-SCNC: 4.6 MMOL/L (ref 3.5–5.1)
PROT SERPL-MCNC: 7.8 G/DL (ref 6.4–8.2)
SODIUM SERPL-SCNC: 137 MMOL/L (ref 136–145)
TRIGL SERPL-MCNC: 166 MG/DL
VLDLC SERPL CALC-MCNC: 33.2 MG/DL

## 2024-10-31 RX ORDER — ROSUVASTATIN CALCIUM 5 MG/1
TABLET, COATED ORAL
Qty: 90 TABLET | Refills: 3 | Status: SHIPPED | OUTPATIENT
Start: 2024-10-31

## 2024-12-19 ENCOUNTER — OFFICE VISIT (OUTPATIENT)
Facility: CLINIC | Age: 79
End: 2024-12-19
Payer: MEDICARE

## 2024-12-19 VITALS
OXYGEN SATURATION: 98 % | SYSTOLIC BLOOD PRESSURE: 116 MMHG | HEIGHT: 65 IN | BODY MASS INDEX: 30.69 KG/M2 | RESPIRATION RATE: 16 BRPM | TEMPERATURE: 97.4 F | WEIGHT: 184.2 LBS | DIASTOLIC BLOOD PRESSURE: 70 MMHG | HEART RATE: 76 BPM

## 2024-12-19 DIAGNOSIS — R73.03 PREDIABETES: ICD-10-CM

## 2024-12-19 DIAGNOSIS — E78.5 HYPERLIPIDEMIA LDL GOAL <100: ICD-10-CM

## 2024-12-19 DIAGNOSIS — I10 ESSENTIAL (PRIMARY) HYPERTENSION: ICD-10-CM

## 2024-12-19 DIAGNOSIS — G47.9 SLEEP DISORDER: ICD-10-CM

## 2024-12-19 DIAGNOSIS — K21.9 GASTRO-ESOPHAGEAL REFLUX DISEASE WITHOUT ESOPHAGITIS: ICD-10-CM

## 2024-12-19 DIAGNOSIS — K31.84 GASTROPARESIS: Primary | ICD-10-CM

## 2024-12-19 PROCEDURE — G8484 FLU IMMUNIZE NO ADMIN: HCPCS | Performed by: INTERNAL MEDICINE

## 2024-12-19 PROCEDURE — G8417 CALC BMI ABV UP PARAM F/U: HCPCS | Performed by: INTERNAL MEDICINE

## 2024-12-19 PROCEDURE — 1036F TOBACCO NON-USER: CPT | Performed by: INTERNAL MEDICINE

## 2024-12-19 PROCEDURE — 3078F DIAST BP <80 MM HG: CPT | Performed by: INTERNAL MEDICINE

## 2024-12-19 PROCEDURE — 1123F ACP DISCUSS/DSCN MKR DOCD: CPT | Performed by: INTERNAL MEDICINE

## 2024-12-19 PROCEDURE — G8428 CUR MEDS NOT DOCUMENT: HCPCS | Performed by: INTERNAL MEDICINE

## 2024-12-19 PROCEDURE — 1125F AMNT PAIN NOTED PAIN PRSNT: CPT | Performed by: INTERNAL MEDICINE

## 2024-12-19 PROCEDURE — G8399 PT W/DXA RESULTS DOCUMENT: HCPCS | Performed by: INTERNAL MEDICINE

## 2024-12-19 PROCEDURE — 99214 OFFICE O/P EST MOD 30 MIN: CPT | Performed by: INTERNAL MEDICINE

## 2024-12-19 PROCEDURE — 1090F PRES/ABSN URINE INCON ASSESS: CPT | Performed by: INTERNAL MEDICINE

## 2024-12-19 PROCEDURE — 3074F SYST BP LT 130 MM HG: CPT | Performed by: INTERNAL MEDICINE

## 2024-12-30 DIAGNOSIS — G47.9 SLEEP DISORDER: ICD-10-CM

## 2024-12-30 RX ORDER — ALPRAZOLAM 0.25 MG/1
TABLET ORAL
Qty: 60 TABLET | Refills: 2 | Status: SHIPPED | OUTPATIENT
Start: 2024-12-30 | End: 2025-02-28

## 2025-02-02 DIAGNOSIS — I10 ESSENTIAL (PRIMARY) HYPERTENSION: ICD-10-CM

## 2025-02-02 RX ORDER — OLMESARTAN MEDOXOMIL 20 MG/1
20 TABLET ORAL DAILY
Qty: 90 TABLET | Refills: 2 | Status: SHIPPED | OUTPATIENT
Start: 2025-02-02

## 2025-04-26 SDOH — ECONOMIC STABILITY: FOOD INSECURITY: WITHIN THE PAST 12 MONTHS, YOU WORRIED THAT YOUR FOOD WOULD RUN OUT BEFORE YOU GOT MONEY TO BUY MORE.: NEVER TRUE

## 2025-04-26 SDOH — ECONOMIC STABILITY: INCOME INSECURITY: IN THE LAST 12 MONTHS, WAS THERE A TIME WHEN YOU WERE NOT ABLE TO PAY THE MORTGAGE OR RENT ON TIME?: NO

## 2025-04-26 SDOH — ECONOMIC STABILITY: FOOD INSECURITY: WITHIN THE PAST 12 MONTHS, THE FOOD YOU BOUGHT JUST DIDN'T LAST AND YOU DIDN'T HAVE MONEY TO GET MORE.: NEVER TRUE

## 2025-04-26 SDOH — HEALTH STABILITY: PHYSICAL HEALTH: ON AVERAGE, HOW MANY MINUTES DO YOU ENGAGE IN EXERCISE AT THIS LEVEL?: 20 MIN

## 2025-04-26 SDOH — ECONOMIC STABILITY: TRANSPORTATION INSECURITY
IN THE PAST 12 MONTHS, HAS LACK OF TRANSPORTATION KEPT YOU FROM MEETINGS, WORK, OR FROM GETTING THINGS NEEDED FOR DAILY LIVING?: NO

## 2025-04-26 SDOH — HEALTH STABILITY: PHYSICAL HEALTH: ON AVERAGE, HOW MANY DAYS PER WEEK DO YOU ENGAGE IN MODERATE TO STRENUOUS EXERCISE (LIKE A BRISK WALK)?: 2 DAYS

## 2025-04-26 SDOH — ECONOMIC STABILITY: TRANSPORTATION INSECURITY
IN THE PAST 12 MONTHS, HAS THE LACK OF TRANSPORTATION KEPT YOU FROM MEDICAL APPOINTMENTS OR FROM GETTING MEDICATIONS?: NO

## 2025-04-26 ASSESSMENT — PATIENT HEALTH QUESTIONNAIRE - PHQ9
2. FEELING DOWN, DEPRESSED OR HOPELESS: NOT AT ALL
SUM OF ALL RESPONSES TO PHQ QUESTIONS 1-9: 0
1. LITTLE INTEREST OR PLEASURE IN DOING THINGS: NOT AT ALL

## 2025-04-26 ASSESSMENT — LIFESTYLE VARIABLES
HOW OFTEN DO YOU HAVE A DRINK CONTAINING ALCOHOL: 2-3 TIMES A WEEK
HOW OFTEN DO YOU HAVE A DRINK CONTAINING ALCOHOL: 4
HOW MANY STANDARD DRINKS CONTAINING ALCOHOL DO YOU HAVE ON A TYPICAL DAY: 1
HOW MANY STANDARD DRINKS CONTAINING ALCOHOL DO YOU HAVE ON A TYPICAL DAY: 1 OR 2

## 2025-04-27 DIAGNOSIS — Z00.00 MEDICARE ANNUAL WELLNESS VISIT, SUBSEQUENT: Primary | ICD-10-CM

## 2025-04-27 NOTE — PROGRESS NOTES
Flory Graff (:  1945) is a 79 y.o. female,Established patient, here for evaluation of the following chief complaint(s):  Medicare AWV (Patient is fasting)          Diagnosis Orders   1. Medicare annual wellness visit, subsequent        2. Gastroparesis        3. Essential (primary) hypertension  CBC      4. Hyperlipidemia LDL goal <100  Lipid Panel    Comprehensive Metabolic Panel      5. Sleep disorder  ALPRAZolam (XANAX) 0.25 MG tablet      6. Prediabetes  Hemoglobin A1C      7. Gastro-esophageal reflux disease without esophagitis           Erythromycin 250 mg mg two times a day   Benicar  Crestor  Xanax  Eating right   Voquenza   Assessment & Plan  1. Gastroparesis.  - Reports not doing well with gastroparesis and is currently taking erythromycin 250 mg twice a day, although prefers to take it once or twice a day at odd hours.  - Plans to discontinue azithromycin this week if possible.  - Currently taking Voquenza since 2025, replacing Dexilant.  - Advised to continue current regimen and monitor symptoms; further evaluation may be necessary if symptoms persist.    2. Hypertension.  - Blood pressure is well-controlled on olmesartan.  - Reports no dizziness or lightheadedness.  - Advised to continue current medication regimen.    3. Hyperlipidemia.  - Currently on rosuvastatin and reports no muscle aches or leg cramps.  - Advised to continue current medication regimen.    4. Sleep management.  - Uses Xanax every night to help with sleep and reports no next-day tiredness.  - Advised to continue using Xanax as needed for sleep.    5. Shingles.  - History of shingles in both eyes and is taking Valtrex daily as recommended by eye doctor.  - Given a new prescription for eyeglasses.  - Advised to continue Valtrex to prevent recurrence.    6. Ear discomfort.  - Reports sensation of a cut on both sides of ears, likely due to glasses.  - Advised to apply CeraVe healing ointment to affected areas to

## 2025-04-29 ENCOUNTER — OFFICE VISIT (OUTPATIENT)
Facility: CLINIC | Age: 80
End: 2025-04-29
Payer: MEDICARE

## 2025-04-29 VITALS
WEIGHT: 164.6 LBS | RESPIRATION RATE: 16 BRPM | HEART RATE: 66 BPM | TEMPERATURE: 97.2 F | SYSTOLIC BLOOD PRESSURE: 111 MMHG | OXYGEN SATURATION: 98 % | HEIGHT: 65 IN | BODY MASS INDEX: 27.42 KG/M2 | DIASTOLIC BLOOD PRESSURE: 75 MMHG

## 2025-04-29 DIAGNOSIS — I10 ESSENTIAL (PRIMARY) HYPERTENSION: ICD-10-CM

## 2025-04-29 DIAGNOSIS — R73.03 PREDIABETES: ICD-10-CM

## 2025-04-29 DIAGNOSIS — G47.9 SLEEP DISORDER: ICD-10-CM

## 2025-04-29 DIAGNOSIS — Z00.00 MEDICARE ANNUAL WELLNESS VISIT, SUBSEQUENT: Primary | ICD-10-CM

## 2025-04-29 DIAGNOSIS — E78.5 HYPERLIPIDEMIA LDL GOAL <100: ICD-10-CM

## 2025-04-29 DIAGNOSIS — K21.9 GASTRO-ESOPHAGEAL REFLUX DISEASE WITHOUT ESOPHAGITIS: ICD-10-CM

## 2025-04-29 DIAGNOSIS — K31.84 GASTROPARESIS: ICD-10-CM

## 2025-04-29 LAB
ALBUMIN SERPL-MCNC: 4.2 G/DL (ref 3.5–5)
ALBUMIN/GLOB SERPL: 1.1 (ref 1.1–2.2)
ALP SERPL-CCNC: 94 U/L (ref 45–117)
ALT SERPL-CCNC: 26 U/L (ref 12–78)
ANION GAP SERPL CALC-SCNC: 6 MMOL/L (ref 2–12)
AST SERPL-CCNC: 21 U/L (ref 15–37)
BILIRUB SERPL-MCNC: 0.7 MG/DL (ref 0.2–1)
BUN SERPL-MCNC: 18 MG/DL (ref 6–20)
BUN/CREAT SERPL: 21 (ref 12–20)
CALCIUM SERPL-MCNC: 9.9 MG/DL (ref 8.5–10.1)
CHLORIDE SERPL-SCNC: 103 MMOL/L (ref 97–108)
CHOLEST SERPL-MCNC: 145 MG/DL
CO2 SERPL-SCNC: 29 MMOL/L (ref 21–32)
CREAT SERPL-MCNC: 0.87 MG/DL (ref 0.55–1.02)
ERYTHROCYTE [DISTWIDTH] IN BLOOD BY AUTOMATED COUNT: 13.2 % (ref 11.5–14.5)
EST. AVERAGE GLUCOSE BLD GHB EST-MCNC: 114 MG/DL
GLOBULIN SER CALC-MCNC: 3.7 G/DL (ref 2–4)
GLUCOSE SERPL-MCNC: 109 MG/DL (ref 65–100)
HBA1C MFR BLD: 5.6 % (ref 4–5.6)
HCT VFR BLD AUTO: 40.9 % (ref 35–47)
HDLC SERPL-MCNC: 63 MG/DL
HDLC SERPL: 2.3 (ref 0–5)
HGB BLD-MCNC: 13.7 G/DL (ref 11.5–16)
LDLC SERPL CALC-MCNC: 62.4 MG/DL (ref 0–100)
MCH RBC QN AUTO: 31.7 PG (ref 26–34)
MCHC RBC AUTO-ENTMCNC: 33.5 G/DL (ref 30–36.5)
MCV RBC AUTO: 94.7 FL (ref 80–99)
NRBC # BLD: 0 K/UL (ref 0–0.01)
NRBC BLD-RTO: 0 PER 100 WBC
PLATELET # BLD AUTO: 214 K/UL (ref 150–400)
PMV BLD AUTO: 11 FL (ref 8.9–12.9)
POTASSIUM SERPL-SCNC: 4.4 MMOL/L (ref 3.5–5.1)
PROT SERPL-MCNC: 7.9 G/DL (ref 6.4–8.2)
RBC # BLD AUTO: 4.32 M/UL (ref 3.8–5.2)
SODIUM SERPL-SCNC: 138 MMOL/L (ref 136–145)
TRIGL SERPL-MCNC: 98 MG/DL
VLDLC SERPL CALC-MCNC: 19.6 MG/DL
WBC # BLD AUTO: 8.4 K/UL (ref 3.6–11)

## 2025-04-29 PROCEDURE — 1123F ACP DISCUSS/DSCN MKR DOCD: CPT | Performed by: INTERNAL MEDICINE

## 2025-04-29 PROCEDURE — 1036F TOBACCO NON-USER: CPT | Performed by: INTERNAL MEDICINE

## 2025-04-29 PROCEDURE — G8417 CALC BMI ABV UP PARAM F/U: HCPCS | Performed by: INTERNAL MEDICINE

## 2025-04-29 PROCEDURE — 99214 OFFICE O/P EST MOD 30 MIN: CPT | Performed by: INTERNAL MEDICINE

## 2025-04-29 PROCEDURE — 1090F PRES/ABSN URINE INCON ASSESS: CPT | Performed by: INTERNAL MEDICINE

## 2025-04-29 PROCEDURE — 1160F RVW MEDS BY RX/DR IN RCRD: CPT | Performed by: INTERNAL MEDICINE

## 2025-04-29 PROCEDURE — G0439 PPPS, SUBSEQ VISIT: HCPCS | Performed by: INTERNAL MEDICINE

## 2025-04-29 PROCEDURE — G8427 DOCREV CUR MEDS BY ELIG CLIN: HCPCS | Performed by: INTERNAL MEDICINE

## 2025-04-29 PROCEDURE — 3074F SYST BP LT 130 MM HG: CPT | Performed by: INTERNAL MEDICINE

## 2025-04-29 PROCEDURE — 3078F DIAST BP <80 MM HG: CPT | Performed by: INTERNAL MEDICINE

## 2025-04-29 PROCEDURE — 1159F MED LIST DOCD IN RCRD: CPT | Performed by: INTERNAL MEDICINE

## 2025-04-29 PROCEDURE — G8399 PT W/DXA RESULTS DOCUMENT: HCPCS | Performed by: INTERNAL MEDICINE

## 2025-04-29 RX ORDER — VONOPRAZAN FUMARATE 13.36 MG/1
TABLET ORAL
COMMUNITY
Start: 2025-04-07

## 2025-04-29 RX ORDER — ALPRAZOLAM 0.25 MG
0.25 TABLET ORAL 2 TIMES DAILY PRN
Qty: 60 TABLET | Refills: 3
Start: 2025-04-29 | End: 2025-06-28

## 2025-04-29 ASSESSMENT — LIFESTYLE VARIABLES
HOW OFTEN DURING THE LAST YEAR HAVE YOU BEEN UNABLE TO REMEMBER WHAT HAPPENED THE NIGHT BEFORE BECAUSE YOU HAD BEEN DRINKING: NEVER
HOW MANY STANDARD DRINKS CONTAINING ALCOHOL DO YOU HAVE ON A TYPICAL DAY: 1 OR 2
HAS A RELATIVE, FRIEND, DOCTOR, OR ANOTHER HEALTH PROFESSIONAL EXPRESSED CONCERN ABOUT YOUR DRINKING OR SUGGESTED YOU CUT DOWN: NO
HOW OFTEN DURING THE LAST YEAR HAVE YOU HAD A FEELING OF GUILT OR REMORSE AFTER DRINKING: NEVER
HOW OFTEN DURING THE LAST YEAR HAVE YOU NEEDED AN ALCOHOLIC DRINK FIRST THING IN THE MORNING TO GET YOURSELF GOING AFTER A NIGHT OF HEAVY DRINKING: NEVER
HAVE YOU OR SOMEONE ELSE BEEN INJURED AS A RESULT OF YOUR DRINKING: NO
HOW OFTEN DO YOU HAVE A DRINK CONTAINING ALCOHOL: 4 OR MORE TIMES A WEEK
HOW OFTEN DURING THE LAST YEAR HAVE YOU FAILED TO DO WHAT WAS NORMALLY EXPECTED FROM YOU BECAUSE OF DRINKING: NEVER
HOW OFTEN DURING THE LAST YEAR HAVE YOU FOUND THAT YOU WERE NOT ABLE TO STOP DRINKING ONCE YOU HAD STARTED: NEVER

## 2025-04-29 NOTE — PATIENT INSTRUCTIONS
always ask your healthcare professional. [employer], [facility], disclaims any warranty or liability for your use of this information.    Personalized Preventive Plan for Flory Graff - 4/29/2025  Medicare offers a range of preventive health benefits. Some of the tests and screenings are paid in full while other may be subject to a deductible, co-insurance, and/or copay.  Some of these benefits include a comprehensive review of your medical history including lifestyle, illnesses that may run in your family, and various assessments and screenings as appropriate.  After reviewing your medical record and screening and assessments performed today your provider may have ordered immunizations, labs, imaging, and/or referrals for you.  A list of these orders (if applicable) as well as your Preventive Care list are included within your After Visit Summary for your review.

## 2025-04-29 NOTE — PROGRESS NOTES
Have you been to the ER, urgent care clinic since your last visit?  Hospitalized since your last visit?   NO    Have you seen or consulted any other health care providers outside our system since your last visit?   NO